# Patient Record
Sex: MALE | Race: WHITE | NOT HISPANIC OR LATINO | Employment: OTHER | URBAN - METROPOLITAN AREA
[De-identification: names, ages, dates, MRNs, and addresses within clinical notes are randomized per-mention and may not be internally consistent; named-entity substitution may affect disease eponyms.]

---

## 2019-12-10 ENCOUNTER — OFFICE VISIT (OUTPATIENT)
Dept: FAMILY MEDICINE | Facility: CLINIC | Age: 69
End: 2019-12-10
Payer: MEDICARE

## 2019-12-10 VITALS
BODY MASS INDEX: 19.51 KG/M2 | WEIGHT: 156.94 LBS | DIASTOLIC BLOOD PRESSURE: 84 MMHG | RESPIRATION RATE: 18 BRPM | SYSTOLIC BLOOD PRESSURE: 130 MMHG | HEART RATE: 74 BPM | TEMPERATURE: 98 F | HEIGHT: 75 IN | OXYGEN SATURATION: 97 %

## 2019-12-10 DIAGNOSIS — Z00.00 PERIODIC HEALTH ASSESSMENT, GENERAL SCREENING, ADULT: ICD-10-CM

## 2019-12-10 DIAGNOSIS — R53.83 FATIGUE, UNSPECIFIED TYPE: Primary | ICD-10-CM

## 2019-12-10 DIAGNOSIS — J41.0 SIMPLE CHRONIC BRONCHITIS: ICD-10-CM

## 2019-12-10 DIAGNOSIS — K08.9 DENTAL DISEASE: ICD-10-CM

## 2019-12-10 DIAGNOSIS — Z72.0 TOBACCO ABUSE: ICD-10-CM

## 2019-12-10 PROCEDURE — 99204 OFFICE O/P NEW MOD 45 MIN: CPT | Mod: S$GLB,,, | Performed by: PHYSICIAN ASSISTANT

## 2019-12-10 PROCEDURE — 99999 PR PBB SHADOW E&M-NEW PATIENT-LVL IV: ICD-10-PCS | Mod: PBBFAC,,, | Performed by: PHYSICIAN ASSISTANT

## 2019-12-10 PROCEDURE — 1159F MED LIST DOCD IN RCRD: CPT | Mod: S$GLB,,, | Performed by: PHYSICIAN ASSISTANT

## 2019-12-10 PROCEDURE — 99204 PR OFFICE/OUTPT VISIT, NEW, LEVL IV, 45-59 MIN: ICD-10-PCS | Mod: S$GLB,,, | Performed by: PHYSICIAN ASSISTANT

## 2019-12-10 PROCEDURE — 1126F PR PAIN SEVERITY QUANTIFIED, NO PAIN PRESENT: ICD-10-PCS | Mod: S$GLB,,, | Performed by: PHYSICIAN ASSISTANT

## 2019-12-10 PROCEDURE — 1126F AMNT PAIN NOTED NONE PRSNT: CPT | Mod: S$GLB,,, | Performed by: PHYSICIAN ASSISTANT

## 2019-12-10 PROCEDURE — 99999 PR PBB SHADOW E&M-NEW PATIENT-LVL IV: CPT | Mod: PBBFAC,,, | Performed by: PHYSICIAN ASSISTANT

## 2019-12-10 PROCEDURE — 1159F PR MEDICATION LIST DOCUMENTED IN MEDICAL RECORD: ICD-10-PCS | Mod: S$GLB,,, | Performed by: PHYSICIAN ASSISTANT

## 2019-12-10 PROCEDURE — 1101F PT FALLS ASSESS-DOCD LE1/YR: CPT | Mod: CPTII,S$GLB,, | Performed by: PHYSICIAN ASSISTANT

## 2019-12-10 PROCEDURE — 1101F PR PT FALLS ASSESS DOC 0-1 FALLS W/OUT INJ PAST YR: ICD-10-PCS | Mod: CPTII,S$GLB,, | Performed by: PHYSICIAN ASSISTANT

## 2019-12-10 RX ORDER — ALBUTEROL SULFATE 90 UG/1
2 AEROSOL, METERED RESPIRATORY (INHALATION) EVERY 4 HOURS PRN
Qty: 1 INHALER | Refills: 5 | Status: SHIPPED | OUTPATIENT
Start: 2019-12-10 | End: 2020-05-22

## 2019-12-10 RX ORDER — ALBUTEROL SULFATE 90 UG/1
AEROSOL, METERED RESPIRATORY (INHALATION)
COMMUNITY
Start: 2016-06-14 | End: 2019-12-10 | Stop reason: SDUPTHER

## 2019-12-10 RX ORDER — BUDESONIDE AND FORMOTEROL FUMARATE DIHYDRATE 160; 4.5 UG/1; UG/1
2 AEROSOL RESPIRATORY (INHALATION) 2 TIMES DAILY
Qty: 1 INHALER | Refills: 12 | Status: SHIPPED | OUTPATIENT
Start: 2019-12-10 | End: 2021-01-08 | Stop reason: SDUPTHER

## 2019-12-10 NOTE — PROGRESS NOTES
Subjective:       Patient ID: Mejia Sandoval is a 69 y.o. male.    Chief Complaint: Medication Refill    HPI   Pt needs refill on albuterol  Hx long use tobacco  Has cough and wheeze  Pt. Is interested in stopping the smoking  Feels well  No regular meds  Has dental disease  fatigue  Review of Systems   Constitutional: Positive for fatigue. Negative for activity change, appetite change, chills, diaphoresis, fever and unexpected weight change.   HENT: Negative.    Eyes: Negative.    Respiratory: Positive for cough and wheezing.    Cardiovascular: Negative.  Negative for chest pain and leg swelling.   Gastrointestinal: Negative.    Endocrine: Negative.    Genitourinary: Negative.    Musculoskeletal: Negative.    Skin: Negative.  Negative for rash.   Neurological: Negative.        Objective:      Physical Exam   Constitutional: He appears well-developed and well-nourished. No distress.   HENT:   Head: Normocephalic and atraumatic.   Right Ear: External ear normal.   Left Ear: External ear normal.   Nose: Nose normal.   Mouth/Throat: Oropharynx is clear and moist. No oropharyngeal exudate.   Eyes: Conjunctivae are normal. No scleral icterus.   Neck: Normal range of motion. Neck supple. No tracheal deviation present. No thyromegaly present.   Carotids clear   Cardiovascular: Normal rate, regular rhythm, normal heart sounds and intact distal pulses. Exam reveals no gallop and no friction rub.   No murmur heard.  Pulmonary/Chest: Effort normal and breath sounds normal. No stridor. No respiratory distress. He has no wheezes. He has no rales.   Abdominal: Soft. Bowel sounds are normal. He exhibits no distension and no mass. There is no tenderness. There is no rebound and no guarding. No hernia.   No organomegaly   Musculoskeletal: He exhibits no edema.   Lymphadenopathy:     He has no cervical adenopathy.   Skin: Skin is warm and dry. No rash noted.   Vitals reviewed.      Assessment:       1. Fatigue, unspecified type    2.  Simple chronic bronchitis    3. Tobacco abuse    4. Dental disease    5. Periodic health assessment, general screening, adult        Plan:       Mejia was seen today for medication refill.    Diagnoses and all orders for this visit:    Fatigue, unspecified type  -     Comprehensive metabolic panel; Future  -     T3; Future  -     T4, free; Future  -     TSH; Future  -     CBC auto differential; Future  -     Hepatitis C antibody; Future  -     Urinalysis; Future    Simple chronic bronchitis  -     Comprehensive metabolic panel; Future  -     T3; Future  -     T4, free; Future  -     TSH; Future  -     CBC auto differential; Future  -     Hepatitis C antibody; Future  -     X-Ray Chest PA And Lateral; Future  -     albuterol (VENTOLIN HFA) 90 mcg/actuation inhaler; Inhale 2 puffs into the lungs every 4 (four) hours as needed. Rescue  -     budesonide-formoterol 160-4.5 mcg (SYMBICORT) 160-4.5 mcg/actuation HFAA; Inhale 2 puffs into the lungs 2 (two) times daily.  -     Urinalysis; Future    Tobacco abuse  -     Ambulatory referral to Smoking Cessation Program  -     Comprehensive metabolic panel; Future  -     T3; Future  -     T4, free; Future  -     TSH; Future  -     CBC auto differential; Future  -     Hepatitis C antibody; Future  -     X-Ray Chest PA And Lateral; Future  -     Urinalysis; Future    Dental disease  -     Comprehensive metabolic panel; Future  -     T3; Future  -     T4, free; Future  -     TSH; Future  -     CBC auto differential; Future  -     Hepatitis C antibody; Future  -     Urinalysis; Future    Periodic health assessment, general screening, adult  -     Comprehensive metabolic panel; Future  -     T3; Future  -     T4, free; Future  -     TSH; Future  -     CBC auto differential; Future  -     Hepatitis C antibody; Future  -     Urinalysis; Future    discussed otc's  F/u with PCP  Dental appt.

## 2020-05-21 DIAGNOSIS — J41.0 SIMPLE CHRONIC BRONCHITIS: ICD-10-CM

## 2020-05-22 RX ORDER — ALBUTEROL SULFATE 90 UG/1
AEROSOL, METERED RESPIRATORY (INHALATION)
Qty: 18 G | Refills: 0 | Status: SHIPPED | OUTPATIENT
Start: 2020-05-22 | End: 2020-08-10 | Stop reason: SDUPTHER

## 2020-08-10 DIAGNOSIS — J41.0 SIMPLE CHRONIC BRONCHITIS: ICD-10-CM

## 2020-08-10 RX ORDER — ALBUTEROL SULFATE 90 UG/1
2 AEROSOL, METERED RESPIRATORY (INHALATION) EVERY 4 HOURS PRN
Qty: 18 G | Refills: 0 | Status: SHIPPED | OUTPATIENT
Start: 2020-08-10 | End: 2020-09-11

## 2020-08-10 NOTE — TELEPHONE ENCOUNTER
----- Message from Heather Freedman sent at 8/10/2020  1:43 PM CDT -----  Type: Needs Medical Advice  Who Called:  Patient  Pharmacy name and phone #: [facility]  Best Call Back Number: [phone] (home)   Additional Information: The patient wants the Rx -- VENTOLIN HFA 90 mcg/actuation inhaler sent to the pharm today

## 2020-10-07 DIAGNOSIS — J41.0 SIMPLE CHRONIC BRONCHITIS: ICD-10-CM

## 2020-10-07 RX ORDER — ALBUTEROL SULFATE 90 UG/1
AEROSOL, METERED RESPIRATORY (INHALATION)
Qty: 18 G | Refills: 0 | Status: SHIPPED | OUTPATIENT
Start: 2020-10-07 | End: 2021-01-08 | Stop reason: SDUPTHER

## 2020-10-07 NOTE — TELEPHONE ENCOUNTER
----- Message from Loyda Torrey sent at 10/7/2020  4:47 PM CDT -----  Regarding: refill  Contact: patient  Type:  RX Refill Request    Who Called:  patient  Refill or New Rx:  refill  RX Name and Strength:  VENTOLIN HFA 90 mcg/actuation inhaler  How is the patient currently taking it? (ex. 1XDay):  as needed  Is this a 30 day or 90 day RX:  n/a    Preferred Pharmacy with phone number:    [id number]  [place] - [address]  [place] [address]  Phone: [phone] Fax: [phone]    Local or Mail Order:  local  Ordering Provider:  JUAN JOSÉ Barakat  Best Call Back Number:  [phone] (home)     Additional Information:

## 2021-01-05 DIAGNOSIS — J41.0 SIMPLE CHRONIC BRONCHITIS: ICD-10-CM

## 2021-01-08 RX ORDER — BUDESONIDE AND FORMOTEROL FUMARATE DIHYDRATE 160; 4.5 UG/1; UG/1
2 AEROSOL RESPIRATORY (INHALATION) 2 TIMES DAILY
Qty: 1 INHALER | Refills: 12 | Status: SHIPPED | OUTPATIENT
Start: 2021-01-08 | End: 2021-07-26 | Stop reason: SDUPTHER

## 2021-01-08 RX ORDER — ALBUTEROL SULFATE 90 UG/1
AEROSOL, METERED RESPIRATORY (INHALATION)
Qty: 18 G | Refills: 1 | Status: SHIPPED | OUTPATIENT
Start: 2021-01-08 | End: 2021-03-14

## 2021-07-06 ENCOUNTER — HOSPITAL ENCOUNTER (EMERGENCY)
Facility: HOSPITAL | Age: 71
Discharge: HOME | End: 2021-07-06
Attending: EMERGENCY MEDICINE
Payer: MEDICARE

## 2021-07-06 VITALS
TEMPERATURE: 98 F | RESPIRATION RATE: 18 BRPM | WEIGHT: 170 LBS | SYSTOLIC BLOOD PRESSURE: 152 MMHG | DIASTOLIC BLOOD PRESSURE: 83 MMHG | HEART RATE: 65 BPM | BODY MASS INDEX: 21.14 KG/M2 | OXYGEN SATURATION: 99 % | HEIGHT: 75 IN

## 2021-07-06 DIAGNOSIS — J44.1 COPD WITH ACUTE EXACERBATION: Primary | ICD-10-CM

## 2021-07-06 DIAGNOSIS — R06.02 SHORTNESS OF BREATH: ICD-10-CM

## 2021-07-06 LAB
ALBUMIN SERPL BCP-MCNC: 3.9 G/DL (ref 3.5–5.2)
ALP SERPL-CCNC: 125 U/L (ref 55–135)
ALT SERPL W/O P-5'-P-CCNC: 17 U/L (ref 10–44)
ANION GAP SERPL CALC-SCNC: 10 MMOL/L (ref 8–16)
AST SERPL-CCNC: 19 U/L (ref 10–40)
BASOPHILS # BLD AUTO: 0.13 K/UL (ref 0–0.2)
BASOPHILS NFR BLD: 1.1 % (ref 0–1.9)
BILIRUB SERPL-MCNC: 0.3 MG/DL (ref 0.1–1)
BUN SERPL-MCNC: 15 MG/DL (ref 8–23)
CALCIUM SERPL-MCNC: 9.9 MG/DL (ref 8.7–10.5)
CHLORIDE SERPL-SCNC: 102 MMOL/L (ref 95–110)
CO2 SERPL-SCNC: 28 MMOL/L (ref 23–29)
CREAT SERPL-MCNC: 1 MG/DL (ref 0.5–1.4)
DIFFERENTIAL METHOD: ABNORMAL
EOSINOPHIL # BLD AUTO: 0.2 K/UL (ref 0–0.5)
EOSINOPHIL NFR BLD: 1.5 % (ref 0–8)
ERYTHROCYTE [DISTWIDTH] IN BLOOD BY AUTOMATED COUNT: 13.5 % (ref 11.5–14.5)
EST. GFR  (AFRICAN AMERICAN): >60 ML/MIN/1.73 M^2
EST. GFR  (NON AFRICAN AMERICAN): >60 ML/MIN/1.73 M^2
GLUCOSE SERPL-MCNC: 98 MG/DL (ref 70–110)
HCT VFR BLD AUTO: 48.8 % (ref 40–54)
HGB BLD-MCNC: 15.6 G/DL (ref 14–18)
IMM GRANULOCYTES # BLD AUTO: 0.04 K/UL (ref 0–0.04)
IMM GRANULOCYTES NFR BLD AUTO: 0.4 % (ref 0–0.5)
LYMPHOCYTES # BLD AUTO: 2.1 K/UL (ref 1–4.8)
LYMPHOCYTES NFR BLD: 18.1 % (ref 18–48)
MCH RBC QN AUTO: 29.4 PG (ref 27–31)
MCHC RBC AUTO-ENTMCNC: 32 G/DL (ref 32–36)
MCV RBC AUTO: 92 FL (ref 82–98)
MONOCYTES # BLD AUTO: 1.1 K/UL (ref 0.3–1)
MONOCYTES NFR BLD: 9.9 % (ref 4–15)
NEUTROPHILS # BLD AUTO: 7.8 K/UL (ref 1.8–7.7)
NEUTROPHILS NFR BLD: 69 % (ref 38–73)
NRBC BLD-RTO: 0 /100 WBC
PLATELET # BLD AUTO: 430 K/UL (ref 150–450)
PMV BLD AUTO: 9.6 FL (ref 9.2–12.9)
POTASSIUM SERPL-SCNC: 5 MMOL/L (ref 3.5–5.1)
PROT SERPL-MCNC: 8.1 G/DL (ref 6–8.4)
RBC # BLD AUTO: 5.3 M/UL (ref 4.6–6.2)
SODIUM SERPL-SCNC: 140 MMOL/L (ref 136–145)
WBC # BLD AUTO: 11.33 K/UL (ref 3.9–12.7)

## 2021-07-06 PROCEDURE — 93005 ELECTROCARDIOGRAM TRACING: CPT

## 2021-07-06 PROCEDURE — 93010 ELECTROCARDIOGRAM REPORT: CPT | Mod: ,,, | Performed by: GENERAL PRACTICE

## 2021-07-06 PROCEDURE — 85025 COMPLETE CBC W/AUTO DIFF WBC: CPT | Performed by: PHYSICIAN ASSISTANT

## 2021-07-06 PROCEDURE — 36415 COLL VENOUS BLD VENIPUNCTURE: CPT | Performed by: PHYSICIAN ASSISTANT

## 2021-07-06 PROCEDURE — 99285 EMERGENCY DEPT VISIT HI MDM: CPT | Mod: 25

## 2021-07-06 PROCEDURE — 25000242 PHARM REV CODE 250 ALT 637 W/ HCPCS: Performed by: PHYSICIAN ASSISTANT

## 2021-07-06 PROCEDURE — 63600175 PHARM REV CODE 636 W HCPCS: Performed by: PHYSICIAN ASSISTANT

## 2021-07-06 PROCEDURE — 96374 THER/PROPH/DIAG INJ IV PUSH: CPT

## 2021-07-06 PROCEDURE — 93010 EKG 12-LEAD: ICD-10-PCS | Mod: ,,, | Performed by: GENERAL PRACTICE

## 2021-07-06 PROCEDURE — 94640 AIRWAY INHALATION TREATMENT: CPT

## 2021-07-06 PROCEDURE — 80053 COMPREHEN METABOLIC PANEL: CPT | Performed by: PHYSICIAN ASSISTANT

## 2021-07-06 RX ORDER — IPRATROPIUM BROMIDE AND ALBUTEROL SULFATE 2.5; .5 MG/3ML; MG/3ML
3 SOLUTION RESPIRATORY (INHALATION)
Status: COMPLETED | OUTPATIENT
Start: 2021-07-06 | End: 2021-07-06

## 2021-07-06 RX ORDER — PREDNISONE 20 MG/1
40 TABLET ORAL DAILY
Qty: 10 TABLET | Refills: 0 | Status: SHIPPED | OUTPATIENT
Start: 2021-07-06 | End: 2021-07-11

## 2021-07-06 RX ORDER — IPRATROPIUM BROMIDE AND ALBUTEROL SULFATE 2.5; .5 MG/3ML; MG/3ML
3 SOLUTION RESPIRATORY (INHALATION) EVERY 6 HOURS PRN
Qty: 4 BOX | Refills: 0 | Status: SHIPPED | OUTPATIENT
Start: 2021-07-06 | End: 2021-07-26 | Stop reason: SDUPTHER

## 2021-07-06 RX ORDER — METHYLPREDNISOLONE SOD SUCC 125 MG
125 VIAL (EA) INJECTION
Status: COMPLETED | OUTPATIENT
Start: 2021-07-06 | End: 2021-07-06

## 2021-07-06 RX ADMIN — IPRATROPIUM BROMIDE AND ALBUTEROL SULFATE 3 ML: .5; 2.5 SOLUTION RESPIRATORY (INHALATION) at 11:07

## 2021-07-06 RX ADMIN — METHYLPREDNISOLONE SODIUM SUCCINATE 125 MG: 125 INJECTION, POWDER, FOR SOLUTION INTRAMUSCULAR; INTRAVENOUS at 12:07

## 2021-07-22 ENCOUNTER — TELEPHONE (OUTPATIENT)
Dept: FAMILY MEDICINE | Facility: CLINIC | Age: 71
End: 2021-07-22

## 2021-07-22 DIAGNOSIS — J41.0 SIMPLE CHRONIC BRONCHITIS: ICD-10-CM

## 2021-07-22 RX ORDER — ALBUTEROL SULFATE 90 UG/1
AEROSOL, METERED RESPIRATORY (INHALATION)
Qty: 18 G | Refills: 0 | OUTPATIENT
Start: 2021-07-22

## 2021-07-26 ENCOUNTER — OFFICE VISIT (OUTPATIENT)
Dept: FAMILY MEDICINE | Facility: CLINIC | Age: 71
End: 2021-07-26
Payer: MEDICARE

## 2021-07-26 VITALS
HEART RATE: 81 BPM | OXYGEN SATURATION: 93 % | BODY MASS INDEX: 17.02 KG/M2 | WEIGHT: 136.88 LBS | SYSTOLIC BLOOD PRESSURE: 122 MMHG | DIASTOLIC BLOOD PRESSURE: 80 MMHG | HEIGHT: 75 IN

## 2021-07-26 DIAGNOSIS — J43.8 OTHER EMPHYSEMA: ICD-10-CM

## 2021-07-26 DIAGNOSIS — F41.9 ANXIETY: ICD-10-CM

## 2021-07-26 DIAGNOSIS — Z72.0 TOBACCO ABUSE: Primary | ICD-10-CM

## 2021-07-26 DIAGNOSIS — R22.1 NECK MASS: ICD-10-CM

## 2021-07-26 DIAGNOSIS — J41.0 SIMPLE CHRONIC BRONCHITIS: ICD-10-CM

## 2021-07-26 PROCEDURE — 99999 PR PBB SHADOW E&M-EST. PATIENT-LVL V: ICD-10-PCS | Mod: PBBFAC,,, | Performed by: PHYSICIAN ASSISTANT

## 2021-07-26 PROCEDURE — 1126F AMNT PAIN NOTED NONE PRSNT: CPT | Mod: CPTII,S$GLB,, | Performed by: PHYSICIAN ASSISTANT

## 2021-07-26 PROCEDURE — 1101F PT FALLS ASSESS-DOCD LE1/YR: CPT | Mod: CPTII,S$GLB,, | Performed by: PHYSICIAN ASSISTANT

## 2021-07-26 PROCEDURE — 1160F RVW MEDS BY RX/DR IN RCRD: CPT | Mod: CPTII,S$GLB,, | Performed by: PHYSICIAN ASSISTANT

## 2021-07-26 PROCEDURE — 3288F FALL RISK ASSESSMENT DOCD: CPT | Mod: CPTII,S$GLB,, | Performed by: PHYSICIAN ASSISTANT

## 2021-07-26 PROCEDURE — 1159F PR MEDICATION LIST DOCUMENTED IN MEDICAL RECORD: ICD-10-PCS | Mod: CPTII,S$GLB,, | Performed by: PHYSICIAN ASSISTANT

## 2021-07-26 PROCEDURE — 3008F BODY MASS INDEX DOCD: CPT | Mod: CPTII,S$GLB,, | Performed by: PHYSICIAN ASSISTANT

## 2021-07-26 PROCEDURE — 3288F PR FALLS RISK ASSESSMENT DOCUMENTED: ICD-10-PCS | Mod: CPTII,S$GLB,, | Performed by: PHYSICIAN ASSISTANT

## 2021-07-26 PROCEDURE — 1159F MED LIST DOCD IN RCRD: CPT | Mod: CPTII,S$GLB,, | Performed by: PHYSICIAN ASSISTANT

## 2021-07-26 PROCEDURE — 3008F PR BODY MASS INDEX (BMI) DOCUMENTED: ICD-10-PCS | Mod: CPTII,S$GLB,, | Performed by: PHYSICIAN ASSISTANT

## 2021-07-26 PROCEDURE — 99999 PR PBB SHADOW E&M-EST. PATIENT-LVL V: CPT | Mod: PBBFAC,,, | Performed by: PHYSICIAN ASSISTANT

## 2021-07-26 PROCEDURE — 99214 OFFICE O/P EST MOD 30 MIN: CPT | Mod: S$GLB,,, | Performed by: PHYSICIAN ASSISTANT

## 2021-07-26 PROCEDURE — 99214 PR OFFICE/OUTPT VISIT, EST, LEVL IV, 30-39 MIN: ICD-10-PCS | Mod: S$GLB,,, | Performed by: PHYSICIAN ASSISTANT

## 2021-07-26 PROCEDURE — 1160F PR REVIEW ALL MEDS BY PRESCRIBER/CLIN PHARMACIST DOCUMENTED: ICD-10-PCS | Mod: CPTII,S$GLB,, | Performed by: PHYSICIAN ASSISTANT

## 2021-07-26 PROCEDURE — 1101F PR PT FALLS ASSESS DOC 0-1 FALLS W/OUT INJ PAST YR: ICD-10-PCS | Mod: CPTII,S$GLB,, | Performed by: PHYSICIAN ASSISTANT

## 2021-07-26 PROCEDURE — 1126F PR PAIN SEVERITY QUANTIFIED, NO PAIN PRESENT: ICD-10-PCS | Mod: CPTII,S$GLB,, | Performed by: PHYSICIAN ASSISTANT

## 2021-07-26 RX ORDER — IPRATROPIUM BROMIDE AND ALBUTEROL SULFATE 2.5; .5 MG/3ML; MG/3ML
3 SOLUTION RESPIRATORY (INHALATION) EVERY 6 HOURS PRN
Qty: 4 BOX | Refills: 5 | Status: SHIPPED | OUTPATIENT
Start: 2021-07-26 | End: 2021-07-26 | Stop reason: SDUPTHER

## 2021-07-26 RX ORDER — IPRATROPIUM BROMIDE AND ALBUTEROL SULFATE 2.5; .5 MG/3ML; MG/3ML
3 SOLUTION RESPIRATORY (INHALATION) EVERY 6 HOURS PRN
Qty: 4 BOX | Refills: 5 | Status: SHIPPED | OUTPATIENT
Start: 2021-07-26 | End: 2022-07-26

## 2021-07-26 RX ORDER — BUPROPION HYDROCHLORIDE 300 MG/1
300 TABLET ORAL DAILY
Qty: 30 TABLET | Refills: 11 | Status: SHIPPED | OUTPATIENT
Start: 2021-07-26 | End: 2023-02-17

## 2021-07-26 RX ORDER — BUDESONIDE AND FORMOTEROL FUMARATE DIHYDRATE 160; 4.5 UG/1; UG/1
2 AEROSOL RESPIRATORY (INHALATION) 2 TIMES DAILY
Qty: 1 INHALER | Refills: 12 | Status: SHIPPED | OUTPATIENT
Start: 2021-07-26 | End: 2022-09-07

## 2021-08-05 ENCOUNTER — HOSPITAL ENCOUNTER (OUTPATIENT)
Dept: RADIOLOGY | Facility: HOSPITAL | Age: 71
Discharge: HOME | End: 2021-08-05
Attending: PHYSICIAN ASSISTANT
Payer: MEDICARE

## 2021-08-05 DIAGNOSIS — R22.1 NECK MASS: ICD-10-CM

## 2021-08-05 PROCEDURE — 76536 US EXAM OF HEAD AND NECK: CPT | Mod: TC

## 2021-08-05 PROCEDURE — 76536 US SOFT TISSUE HEAD NECK THYROID: ICD-10-PCS | Mod: 26,,, | Performed by: RADIOLOGY

## 2021-08-05 PROCEDURE — 76536 US EXAM OF HEAD AND NECK: CPT | Mod: 26,,, | Performed by: RADIOLOGY

## 2021-08-06 ENCOUNTER — TELEPHONE (OUTPATIENT)
Dept: FAMILY MEDICINE | Facility: CLINIC | Age: 71
End: 2021-08-06

## 2022-05-05 ENCOUNTER — TELEPHONE (OUTPATIENT)
Dept: OPHTHALMOLOGY | Facility: CLINIC | Age: 72
End: 2022-05-05
Payer: MEDICAID

## 2022-05-05 ENCOUNTER — TELEPHONE (OUTPATIENT)
Dept: OPTOMETRY | Facility: CLINIC | Age: 72
End: 2022-05-05
Payer: MEDICAID

## 2022-05-05 NOTE — TELEPHONE ENCOUNTER
----- Message from Tasha Villegas sent at 5/5/2022  2:13 PM CDT -----  Contact: pt   [phone]  Pt wants to have cataract sx and wants to schedule eval, please help :)TY!  ----- Message -----  From: Nereida Salvador  Sent: 5/5/2022   1:39 PM CDT  To: [facility] Staff    Type:  Sooner Appointment Request    Caller is requesting a sooner appointment.  Caller declined first available appointment listed below.  Caller will not accept being placed on the waitlist and is requesting a message be sent to doctor.    Name of Caller:  Pt  When is the first available appointment?  NA  Symptoms:  Cataract  Best Call Back Number:  [phone]  Additional Information:  Pt needs cataract removal surgery.Pls call back and advise

## 2022-06-15 ENCOUNTER — TELEPHONE (OUTPATIENT)
Dept: FAMILY MEDICINE | Facility: CLINIC | Age: 72
End: 2022-06-15
Payer: MEDICAID

## 2022-06-15 NOTE — TELEPHONE ENCOUNTER
----- Message from Joanna Hurd sent at 6/15/2022 12:29 PM CDT -----  Regarding: self [phone]  .Type: RX Refill Request    Who Called: self     Have you contacted your pharmacy yes     Refill or New Rx: refill    RX Name and Strength: COMBIVENT RESPIMAT  mcg/actuation inhaler, budesonide-formoterol 160-4.5 mcg (SYMBICORT) 160-4.5 mcg/actuation HFAA    Is this a 30 day or 90 day RX 90 day    Preferred Pharmacy with phone number: .    [id number] - [place] - [address]  [facility] [place] [address]  Phone: [phone] Fax: [phone]    Local or Mail Order: local      Would the patient rather a call back or a response via [facility]?  Call back    Best Call Back Number .[phone]

## 2022-09-07 DIAGNOSIS — J43.8 OTHER EMPHYSEMA: ICD-10-CM

## 2022-09-07 DIAGNOSIS — J41.0 SIMPLE CHRONIC BRONCHITIS: ICD-10-CM

## 2022-09-07 NOTE — TELEPHONE ENCOUNTER
----- Message from Federica Redding sent at 9/7/2022  3:02 PM CDT -----  Regarding: refill  Can the clinic reply in [facility]:       Please refill the medication(s) listed below. Please call the patient when the prescription(s) is ready for  at this phone number   GUME RAHMAN [[id number]] [phone] (home)         Medication #1 COMBIVENT RESPIMAT  mcg/actuation inhaler    Medication #2 budesonide-formoterol 160-4.5 mcg (SYMBICORT) 160-4.5 mcg/actuation HFAA      Preferred Pharmacy:         [facility] #[id number] - [place] - [address] AT [facility] [address]  [address] [place] [address]  Phone: [phone] Fax: [phone]

## 2022-09-19 RX ORDER — BUDESONIDE AND FORMOTEROL FUMARATE DIHYDRATE 160; 4.5 UG/1; UG/1
2 AEROSOL RESPIRATORY (INHALATION) 2 TIMES DAILY
Qty: 30.6 G | Refills: 0 | Status: SHIPPED | OUTPATIENT
Start: 2022-09-19 | End: 2023-01-12

## 2022-09-19 RX ORDER — IPRATROPIUM BROMIDE AND ALBUTEROL 20; 100 UG/1; UG/1
SPRAY, METERED RESPIRATORY (INHALATION)
Qty: 4 G | Refills: 0 | Status: SHIPPED | OUTPATIENT
Start: 2022-09-19 | End: 2022-10-11

## 2022-11-16 ENCOUNTER — TELEPHONE (OUTPATIENT)
Dept: FAMILY MEDICINE | Facility: CLINIC | Age: 72
End: 2022-11-16
Payer: MEDICARE

## 2022-11-16 NOTE — TELEPHONE ENCOUNTER
----- Message from Bel Bernstein sent at 11/16/2022 11:14 AM CST -----  Contact: pt  Type:  RX Refill Request    Who Called:   pt  Refill or New Rx:  refill  RX Name and Strength:   ipratropium-albuteroL (COMBIVENT RESPIMAT)  mcg/actuation inhaler  How is the patient currently taking it? (ex. 1XDay):  as ordered  Is this a 30 day or 90 day RX:  30 with refills  Preferred Pharmacy with phone number:      [id number] - [place], [place] - [address] [facility] [address]  [address] [place] [address]  Phone: [phone] Fax: [phone]    Local or Mail Order:  local  Ordering Provider:  Pj Braxton Call Back Number:  [phone]    Additional Information:    Pt is also needing a new order for a nebulizer along with a refill for his albuterol-ipratropium (DUO-NEB) 2.5 mg-0.5 mg/3 mL nebulizer solution.   Pt said his nebulizer is not working correctly, not pushing medication out.   Pt is asking for a call back please.       
Returned patients call in regards to medication refill. Per Mr Oliva patient will need to be seen in clinic for a medication refill.   
weakness; coumadin/coagulation(Bleeding disorder R/T clinical cond/anti-coags)/other

## 2022-11-21 ENCOUNTER — TELEPHONE (OUTPATIENT)
Dept: FAMILY MEDICINE | Facility: CLINIC | Age: 72
End: 2022-11-21
Payer: MEDICARE

## 2022-11-21 NOTE — TELEPHONE ENCOUNTER
----- Message from Mo Rojas sent at 11/21/2022  3:18 PM CST -----  Type: Needs Medical Advice  Who Called:  Patient    Pharmacy name and phone #:        [id number] - [place] - [address] AT [facility] [address] [facility] [address]  [address] [place] [address]  Phone: [phone] Fax: [phone]    Best Call Back Number:[phone]  Additional Information: Patient states that his inhaler was denied:  ipratropium-albuteroL (COMBIVENT RESPIMAT)  mcg/actuation inhaler    Please call to advise.

## 2022-11-21 NOTE — TELEPHONE ENCOUNTER
Spoke with patient regarding his call , inform him that he had a refill at the pharmacy. Patient will  medication

## 2023-02-16 ENCOUNTER — TELEPHONE (OUTPATIENT)
Dept: FAMILY MEDICINE | Facility: CLINIC | Age: 73
End: 2023-02-16
Payer: MEDICARE

## 2023-02-16 NOTE — TELEPHONE ENCOUNTER
----- Message from Jasson Jarrell sent at 2/16/2023 10:30 AM CST -----  Who Called: patient          Refill or New Rx.:     COMBIVENT RESPIMAT  mcg/actuation inhaler 4 g 0 1/12/2023  No  Sig: INHALE 1 PUFF INTO THE LUNGS EVERY 6 HOURS AS NEEDED FOR WHEEZING OR SHORTNESS OF BREATH  Sent to pharmacy as: COMBIVENT RESPIMAT  mcg/actuation inhaler  Class: Normal    SYMBICORT 160-4.5 mcg/actuation HFAA 30.6 g 0 1/12/2023  No  Sig: INHALE 2 PUFFS INTO THE LUNGS TWICE DAILY  Sent to pharmacy as: SYMBICORT 160-4.5 mcg/actuation HFAA  Class: Normal             Preferred Pharmacy with phone number:         [id number] - [place] - [address] AT [facility] [address]  [address] [address]  Phone: [phone] Fax: [phone]        Local or Mail Order: local          Ordering Provider: alexandre Braxton Call Back Number: [phone]           Additional Information:

## 2023-02-16 NOTE — TELEPHONE ENCOUNTER
----- Message from Laura Garcia sent at 2/16/2023 12:36 PM CST -----  Regarding: Refill Prescriptons  Good afternoon. Mr. Ba would like for someone to reach out to him regarding his two inhaler prescriptions: Combivent respimat and Symbicort. He says it is urgent and he is really struggling without them. He can be reached at his primary phone number on file. Thanks.

## 2023-02-16 NOTE — TELEPHONE ENCOUNTER
Spoke to pt and son and adv pt needs appt. Has not been seen since 2021. Scheduled appt for tomorrow and adv needs to est care with PCP. Adv date, time and location, voiced understanding.

## 2023-02-17 ENCOUNTER — OFFICE VISIT (OUTPATIENT)
Dept: FAMILY MEDICINE | Facility: CLINIC | Age: 73
End: 2023-02-17
Payer: MEDICARE

## 2023-02-17 ENCOUNTER — TELEPHONE (OUTPATIENT)
Dept: FAMILY MEDICINE | Facility: CLINIC | Age: 73
End: 2023-02-17

## 2023-02-17 VITALS
OXYGEN SATURATION: 96 % | DIASTOLIC BLOOD PRESSURE: 74 MMHG | SYSTOLIC BLOOD PRESSURE: 136 MMHG | BODY MASS INDEX: 16.69 KG/M2 | WEIGHT: 134.25 LBS | HEIGHT: 75 IN | HEART RATE: 66 BPM | TEMPERATURE: 94 F

## 2023-02-17 DIAGNOSIS — J43.8 OTHER EMPHYSEMA: ICD-10-CM

## 2023-02-17 DIAGNOSIS — Z00.00 ANNUAL PHYSICAL EXAM: Primary | ICD-10-CM

## 2023-02-17 DIAGNOSIS — Z72.0 TOBACCO ABUSE: ICD-10-CM

## 2023-02-17 PROCEDURE — 1160F RVW MEDS BY RX/DR IN RCRD: CPT | Mod: CPTII,S$GLB,, | Performed by: NURSE PRACTITIONER

## 2023-02-17 PROCEDURE — 3008F BODY MASS INDEX DOCD: CPT | Mod: CPTII,S$GLB,, | Performed by: NURSE PRACTITIONER

## 2023-02-17 PROCEDURE — 99999 PR PBB SHADOW E&M-EST. PATIENT-LVL III: CPT | Mod: PBBFAC,,, | Performed by: NURSE PRACTITIONER

## 2023-02-17 PROCEDURE — 94640 PR INHAL RX, AIRWAY OBST/DX SPUTUM INDUCT: ICD-10-PCS | Mod: S$GLB,,, | Performed by: NURSE PRACTITIONER

## 2023-02-17 PROCEDURE — 3075F PR MOST RECENT SYSTOLIC BLOOD PRESS GE 130-139MM HG: ICD-10-PCS | Mod: CPTII,S$GLB,, | Performed by: NURSE PRACTITIONER

## 2023-02-17 PROCEDURE — 3288F FALL RISK ASSESSMENT DOCD: CPT | Mod: CPTII,S$GLB,, | Performed by: NURSE PRACTITIONER

## 2023-02-17 PROCEDURE — 96372 PR INJECTION,THERAP/PROPH/DIAG2ST, IM OR SUBCUT: ICD-10-PCS | Mod: 59,S$GLB,, | Performed by: NURSE PRACTITIONER

## 2023-02-17 PROCEDURE — 1126F AMNT PAIN NOTED NONE PRSNT: CPT | Mod: CPTII,S$GLB,, | Performed by: NURSE PRACTITIONER

## 2023-02-17 PROCEDURE — 3075F SYST BP GE 130 - 139MM HG: CPT | Mod: CPTII,S$GLB,, | Performed by: NURSE PRACTITIONER

## 2023-02-17 PROCEDURE — 3078F DIAST BP <80 MM HG: CPT | Mod: CPTII,S$GLB,, | Performed by: NURSE PRACTITIONER

## 2023-02-17 PROCEDURE — 1126F PR PAIN SEVERITY QUANTIFIED, NO PAIN PRESENT: ICD-10-PCS | Mod: CPTII,S$GLB,, | Performed by: NURSE PRACTITIONER

## 2023-02-17 PROCEDURE — 1159F PR MEDICATION LIST DOCUMENTED IN MEDICAL RECORD: ICD-10-PCS | Mod: CPTII,S$GLB,, | Performed by: NURSE PRACTITIONER

## 2023-02-17 PROCEDURE — 1160F PR REVIEW ALL MEDS BY PRESCRIBER/CLIN PHARMACIST DOCUMENTED: ICD-10-PCS | Mod: CPTII,S$GLB,, | Performed by: NURSE PRACTITIONER

## 2023-02-17 PROCEDURE — 1101F PR PT FALLS ASSESS DOC 0-1 FALLS W/OUT INJ PAST YR: ICD-10-PCS | Mod: CPTII,S$GLB,, | Performed by: NURSE PRACTITIONER

## 2023-02-17 PROCEDURE — 1101F PT FALLS ASSESS-DOCD LE1/YR: CPT | Mod: CPTII,S$GLB,, | Performed by: NURSE PRACTITIONER

## 2023-02-17 PROCEDURE — 3288F PR FALLS RISK ASSESSMENT DOCUMENTED: ICD-10-PCS | Mod: CPTII,S$GLB,, | Performed by: NURSE PRACTITIONER

## 2023-02-17 PROCEDURE — 99214 PR OFFICE/OUTPT VISIT, EST, LEVL IV, 30-39 MIN: ICD-10-PCS | Mod: 25,S$GLB,, | Performed by: NURSE PRACTITIONER

## 2023-02-17 PROCEDURE — 99214 OFFICE O/P EST MOD 30 MIN: CPT | Mod: 25,S$GLB,, | Performed by: NURSE PRACTITIONER

## 2023-02-17 PROCEDURE — 3078F PR MOST RECENT DIASTOLIC BLOOD PRESSURE < 80 MM HG: ICD-10-PCS | Mod: CPTII,S$GLB,, | Performed by: NURSE PRACTITIONER

## 2023-02-17 PROCEDURE — 99999 PR PBB SHADOW E&M-EST. PATIENT-LVL III: ICD-10-PCS | Mod: PBBFAC,,, | Performed by: NURSE PRACTITIONER

## 2023-02-17 PROCEDURE — 1159F MED LIST DOCD IN RCRD: CPT | Mod: CPTII,S$GLB,, | Performed by: NURSE PRACTITIONER

## 2023-02-17 PROCEDURE — 96372 THER/PROPH/DIAG INJ SC/IM: CPT | Mod: 59,S$GLB,, | Performed by: NURSE PRACTITIONER

## 2023-02-17 PROCEDURE — 94640 AIRWAY INHALATION TREATMENT: CPT | Mod: S$GLB,,, | Performed by: NURSE PRACTITIONER

## 2023-02-17 PROCEDURE — 3008F PR BODY MASS INDEX (BMI) DOCUMENTED: ICD-10-PCS | Mod: CPTII,S$GLB,, | Performed by: NURSE PRACTITIONER

## 2023-02-17 RX ORDER — PREDNISONE 20 MG/1
40 TABLET ORAL DAILY
Qty: 10 TABLET | Refills: 0 | Status: SHIPPED | OUTPATIENT
Start: 2023-02-19 | End: 2023-02-24

## 2023-02-17 RX ORDER — IPRATROPIUM BROMIDE AND ALBUTEROL SULFATE 2.5; .5 MG/3ML; MG/3ML
3 SOLUTION RESPIRATORY (INHALATION)
Status: COMPLETED | OUTPATIENT
Start: 2023-02-17 | End: 2023-02-17

## 2023-02-17 RX ORDER — ALBUTEROL SULFATE 90 UG/1
2 AEROSOL, METERED RESPIRATORY (INHALATION) EVERY 6 HOURS PRN
Qty: 6.7 G | Refills: 5 | Status: SHIPPED | OUTPATIENT
Start: 2023-02-17 | End: 2023-07-25 | Stop reason: SDUPTHER

## 2023-02-17 RX ORDER — IPRATROPIUM BROMIDE AND ALBUTEROL SULFATE 2.5; .5 MG/3ML; MG/3ML
3 SOLUTION RESPIRATORY (INHALATION) EVERY 6 HOURS PRN
Qty: 75 ML | Refills: 3 | Status: SHIPPED | OUTPATIENT
Start: 2023-02-17

## 2023-02-17 RX ORDER — IPRATROPIUM BROMIDE AND ALBUTEROL SULFATE 2.5; .5 MG/3ML; MG/3ML
3 SOLUTION RESPIRATORY (INHALATION) EVERY 6 HOURS PRN
COMMUNITY
End: 2023-02-17 | Stop reason: SDUPTHER

## 2023-02-17 RX ORDER — ALBUTEROL SULFATE 90 UG/1
2 AEROSOL, METERED RESPIRATORY (INHALATION) EVERY 6 HOURS PRN
Qty: 6.7 G | Refills: 5 | Status: SHIPPED | OUTPATIENT
Start: 2023-02-17 | End: 2023-02-17

## 2023-02-17 RX ADMIN — IPRATROPIUM BROMIDE AND ALBUTEROL SULFATE 3 ML: 2.5; .5 SOLUTION RESPIRATORY (INHALATION) at 08:02

## 2023-02-17 NOTE — PROGRESS NOTES
Subjective:       Patient ID: Mejia Sandoval is a 72 y.o. male.    Chief Complaint: Medication Refill    Medication Refill  Pertinent negatives include no chest pain, headaches or rash.       Patient presents today for chronic conditions follow up. He is new to me. Last office visit on 7/26/21. He is due for labs and medication refills.  Past Medical History:   Diagnosis Date    COPD (chronic obstructive pulmonary disease)        Review of patient's allergies indicates:  No Known Allergies      Current Outpatient Medications:     albuterol (PROVENTIL HFA) 90 mcg/actuation inhaler, Inhale 2 puffs into the lungs every 6 (six) hours as needed for Wheezing. Rescue, Disp: 6.7 g, Rfl: 5    albuterol-ipratropium (DUO-NEB) 2.5 mg-0.5 mg/3 mL nebulizer solution, Take 3 mLs by nebulization every 6 (six) hours as needed for Wheezing. Rescue, Disp: 75 mL, Rfl: 3    aspirin/salicylamide/caffeine (BC HEADACHE POWDER ORAL), Take by mouth., Disp: , Rfl:     buPROPion (WELLBUTRIN XL) 300 MG 24 hr tablet, Take 1 tablet (300 mg total) by mouth once daily., Disp: 30 tablet, Rfl: 11    fluticasone-umeclidin-vilanter (TRELEGY ELLIPTA) 100-62.5-25 mcg DsDv, Inhale 1 puff into the lungs once daily., Disp: 28 each, Rfl: 5    Current Facility-Administered Medications:     albuterol-ipratropium 2.5 mg-0.5 mg/3 mL nebulizer solution 3 mL, 3 mL, Nebulization, 1 time in Clinic/HOD, Jovita Macias NP    methylPREDNISolone sod suc(PF) injection 125 mg, 125 mg, Intravenous, 1 time in Clinic/HOD, Jovita Macias NP    Review of Systems   Constitutional:  Negative for unexpected weight change.   HENT:  Negative for trouble swallowing.    Eyes:  Negative for visual disturbance.   Respiratory:  Negative for shortness of breath.    Cardiovascular:  Negative for chest pain, palpitations and leg swelling.   Gastrointestinal:  Negative for blood in stool.   Genitourinary:  Negative for hematuria.   Skin:  Negative for rash.  "  Allergic/Immunologic: Negative for immunocompromised state.   Neurological:  Negative for headaches.   Hematological:  Does not bruise/bleed easily.   Psychiatric/Behavioral:  Negative for agitation. The patient is not nervous/anxious.      Objective:      /74 (BP Location: Right arm, Patient Position: Sitting, BP Method: Medium (Manual))   Pulse 66   Temp (!) 94.1 °F (34.5 °C)   Ht 6' 3" (1.905 m)   Wt 60.9 kg (134 lb 4.2 oz)   SpO2 96%   BMI 16.78 kg/m²   Physical Exam  Constitutional:       Appearance: He is well-developed.   Eyes:      Conjunctiva/sclera: Conjunctivae normal.      Pupils: Pupils are equal, round, and reactive to light.   Cardiovascular:      Rate and Rhythm: Normal rate and regular rhythm.      Heart sounds: Normal heart sounds.   Pulmonary:      Effort: Accessory muscle usage present.      Breath sounds: Examination of the right-upper field reveals wheezing. Examination of the left-upper field reveals wheezing. Examination of the right-middle field reveals wheezing. Examination of the left-middle field reveals wheezing. Examination of the right-lower field reveals wheezing. Examination of the left-lower field reveals wheezing. Wheezing present.   Musculoskeletal:         General: Normal range of motion.   Neurological:      Mental Status: He is alert and oriented to person, place, and time.   Psychiatric:         Behavior: Behavior normal.         Thought Content: Thought content normal.         Judgment: Judgment normal.       Assessment:       1. Annual physical exam    2. Other emphysema    3. Tobacco abuse    4. Body mass index (BMI) less than 16.5        Plan:       Annual physical exam  -     CBC W/ AUTO DIFFERENTIAL; Future; Expected date: 02/17/2023  -     COMPREHENSIVE METABOLIC PANEL; Future; Expected date: 02/17/2023  -     Lipid Panel; Future; Expected date: 02/17/2023  -     TSH; Future; Expected date: 02/17/2023  -     Hepatitis C Antibody; Future; Expected date: " 02/17/2023    Other emphysema  -     NEBULIZER FOR HOME USE  -     methylPREDNISolone sod suc(PF) injection 125 mg  -     albuterol-ipratropium 2.5 mg-0.5 mg/3 mL nebulizer solution 3 mL  -     albuterol-ipratropium (DUO-NEB) 2.5 mg-0.5 mg/3 mL nebulizer solution; Take 3 mLs by nebulization every 6 (six) hours as needed for Wheezing. Rescue  Dispense: 75 mL; Refill: 3  -     albuterol (PROVENTIL HFA) 90 mcg/actuation inhaler; Inhale 2 puffs into the lungs every 6 (six) hours as needed for Wheezing. Rescue  Dispense: 6.7 g; Refill: 5  -     fluticasone-umeclidin-vilanter (TRELEGY ELLIPTA) 100-62.5-25 mcg DsDv; Inhale 1 puff into the lungs once daily.  Dispense: 28 each; Refill: 5    Tobacco abuse  Educated on tobacco cessation  Body mass index (BMI) less than 16.5  Ensure 3 times daily; increase protein intake    Time spent with patient: 20 minutes    Patient with be reevaluated in 6 months or sooner prn    Greater than 50% of this visit was spent counseling as described in above documentation:Yes

## 2023-02-17 NOTE — TELEPHONE ENCOUNTER
Spoke to patient. He states that told him his inhaler would be at the [facility]. He was unsure of what they meant by that. I phoned [facility] @[address]. I was informed that this store does not have the Trelegy Elipta in stock and patient could have rx sent to the [facility].  I was also advised that the patient's  albuterol inhaler was filled somewhere else today and could not be filled at [facility].  Chart showed albuterol inhaler was initially sent to [facility] on [facility] and then to [facility] today.  Phoned [facility] and confirmed they had patients albuterol rx ready for . [facility] also had Trelegy in stock.  Phoned in Trelegy to [facility] as written. Patient was informed and will  his prescriptions at [facility] this evening.  Trelegy was cancelled at [facility].

## 2023-02-17 NOTE — TELEPHONE ENCOUNTER
----- Message from Mo Rojas sent at 2/17/2023  2:50 PM CST -----  Type: Needs Medical Advice  Who Called:  Patient    Pharmacy name and phone #:        [id number] - [place] - [address] AT [facility] & [facility] [address]  [address] [place] [address]  Phone: [phone] Fax: [phone]    Best Call Back Number: [phone]  Additional Information: Patient states that he would like a callback regarding questions about his refills.

## 2023-02-17 NOTE — PATIENT INSTRUCTIONS
Liam Ba,     If you are due for any health screening(s) below please notify me so we can arrange them to be ordered and scheduled to maintain your health. Most healthy patients complete it. Don't lose out on improving your health.     Tests to Keep You Healthy    Colon Cancer Screening: DUE         Colon Cancer Screening    Of cancers affecting both men and women, colorectal cancer is the third leading cancer killer in the [place]. But it doesnt have to be. Screening can prevent colorectal cancer or find it at an early stage when treatment often leads to a cure.    A colonoscopy is the preferred test for detecting colon cancer. It is needed only once every 10 years if results are negative. While sedated, a flexible, lighted tube with a tiny camera is inserted into the rectum and advanced through the colon to look for cancers. An alternative screening test that is used at home and returned to the lab may also be used. It detects hidden blood in bowel movements which could indicate cancer in the colon. If results are positive, you will need a colonoscopy to determine if the blood is a sign of cancer. This type of follow up (diagnostic) colonoscopy usually requires additional copays as required by your insurance provider. Please contact your PCP if you have any questions.    Although you are still overdue for this important screening, due to the COVID-19 pandemic, we recommend scheduling it in the near future.

## 2023-07-25 DIAGNOSIS — J43.8 OTHER EMPHYSEMA: ICD-10-CM

## 2023-07-26 DIAGNOSIS — J43.8 OTHER EMPHYSEMA: ICD-10-CM

## 2023-07-26 RX ORDER — ALBUTEROL SULFATE 90 UG/1
2 AEROSOL, METERED RESPIRATORY (INHALATION) EVERY 6 HOURS PRN
Qty: 6.7 G | Refills: 5 | Status: SHIPPED | OUTPATIENT
Start: 2023-07-26 | End: 2024-07-25

## 2024-05-22 ENCOUNTER — HOSPITAL ENCOUNTER (EMERGENCY)
Facility: HOSPITAL | Age: 74
Discharge: HOME | End: 2024-05-22
Attending: EMERGENCY MEDICINE
Payer: MEDICARE

## 2024-05-22 VITALS
TEMPERATURE: 98 F | RESPIRATION RATE: 20 BRPM | HEIGHT: 74 IN | WEIGHT: 140 LBS | DIASTOLIC BLOOD PRESSURE: 65 MMHG | HEART RATE: 66 BPM | OXYGEN SATURATION: 94 % | BODY MASS INDEX: 17.97 KG/M2 | SYSTOLIC BLOOD PRESSURE: 111 MMHG

## 2024-05-22 DIAGNOSIS — J43.8 OTHER EMPHYSEMA: ICD-10-CM

## 2024-05-22 DIAGNOSIS — R06.02 SOB (SHORTNESS OF BREATH): ICD-10-CM

## 2024-05-22 DIAGNOSIS — J44.1 COPD EXACERBATION: Primary | ICD-10-CM

## 2024-05-22 LAB
ALBUMIN SERPL BCP-MCNC: 4.2 G/DL (ref 3.5–5.2)
ALP SERPL-CCNC: 58 U/L (ref 55–135)
ALT SERPL W/O P-5'-P-CCNC: 13 U/L (ref 10–44)
ANION GAP SERPL CALC-SCNC: 6 MMOL/L (ref 8–16)
AST SERPL-CCNC: 23 U/L (ref 10–40)
BASOPHILS # BLD AUTO: 0.1 K/UL (ref 0–0.2)
BASOPHILS NFR BLD: 1.1 % (ref 0–1.9)
BILIRUB SERPL-MCNC: 0.5 MG/DL (ref 0.1–1)
BNP SERPL-MCNC: 27 PG/ML (ref 0–99)
BUN SERPL-MCNC: 15 MG/DL (ref 8–23)
CALCIUM SERPL-MCNC: 9.1 MG/DL (ref 8.7–10.5)
CHLORIDE SERPL-SCNC: 101 MMOL/L (ref 95–110)
CO2 SERPL-SCNC: 29 MMOL/L (ref 23–29)
CREAT SERPL-MCNC: 0.8 MG/DL (ref 0.5–1.4)
DIFFERENTIAL METHOD BLD: ABNORMAL
EOSINOPHIL # BLD AUTO: 0.1 K/UL (ref 0–0.5)
EOSINOPHIL NFR BLD: 1.2 % (ref 0–8)
ERYTHROCYTE [DISTWIDTH] IN BLOOD BY AUTOMATED COUNT: 14.7 % (ref 11.5–14.5)
EST. GFR  (NO RACE VARIABLE): >60 ML/MIN/1.73 M^2
GLUCOSE SERPL-MCNC: 97 MG/DL (ref 70–110)
HCT VFR BLD AUTO: 43.4 % (ref 40–54)
HGB BLD-MCNC: 14.1 G/DL (ref 14–18)
IMM GRANULOCYTES # BLD AUTO: 0.04 K/UL (ref 0–0.04)
IMM GRANULOCYTES NFR BLD AUTO: 0.4 % (ref 0–0.5)
LYMPHOCYTES # BLD AUTO: 2.3 K/UL (ref 1–4.8)
LYMPHOCYTES NFR BLD: 24.3 % (ref 18–48)
MAGNESIUM SERPL-MCNC: 2.1 MG/DL (ref 1.6–2.6)
MCH RBC QN AUTO: 30.3 PG (ref 27–31)
MCHC RBC AUTO-ENTMCNC: 32.5 G/DL (ref 32–36)
MCV RBC AUTO: 93 FL (ref 82–98)
MONOCYTES # BLD AUTO: 1 K/UL (ref 0.3–1)
MONOCYTES NFR BLD: 10.4 % (ref 4–15)
NEUTROPHILS # BLD AUTO: 5.8 K/UL (ref 1.8–7.7)
NEUTROPHILS NFR BLD: 62.6 % (ref 38–73)
NRBC BLD-RTO: 0 /100 WBC
PLATELET # BLD AUTO: 361 K/UL (ref 150–450)
PMV BLD AUTO: 9.7 FL (ref 9.2–12.9)
POTASSIUM SERPL-SCNC: 4.6 MMOL/L (ref 3.5–5.1)
PROT SERPL-MCNC: 7.2 G/DL (ref 6–8.4)
RBC # BLD AUTO: 4.66 M/UL (ref 4.6–6.2)
SODIUM SERPL-SCNC: 136 MMOL/L (ref 136–145)
TROPONIN I SERPL HS-MCNC: 5.6 PG/ML (ref 0–14.9)
WBC # BLD AUTO: 9.29 K/UL (ref 3.9–12.7)

## 2024-05-22 PROCEDURE — 83880 ASSAY OF NATRIURETIC PEPTIDE: CPT | Performed by: EMERGENCY MEDICINE

## 2024-05-22 PROCEDURE — 36415 COLL VENOUS BLD VENIPUNCTURE: CPT | Performed by: EMERGENCY MEDICINE

## 2024-05-22 PROCEDURE — 93005 ELECTROCARDIOGRAM TRACING: CPT | Performed by: GENERAL PRACTICE

## 2024-05-22 PROCEDURE — 99900031 HC PATIENT EDUCATION (STAT)

## 2024-05-22 PROCEDURE — 83735 ASSAY OF MAGNESIUM: CPT | Performed by: EMERGENCY MEDICINE

## 2024-05-22 PROCEDURE — 94640 AIRWAY INHALATION TREATMENT: CPT

## 2024-05-22 PROCEDURE — 80053 COMPREHEN METABOLIC PANEL: CPT | Performed by: EMERGENCY MEDICINE

## 2024-05-22 PROCEDURE — 25000242 PHARM REV CODE 250 ALT 637 W/ HCPCS: Performed by: EMERGENCY MEDICINE

## 2024-05-22 PROCEDURE — 84484 ASSAY OF TROPONIN QUANT: CPT | Performed by: EMERGENCY MEDICINE

## 2024-05-22 PROCEDURE — 93010 ELECTROCARDIOGRAM REPORT: CPT | Mod: ,,, | Performed by: GENERAL PRACTICE

## 2024-05-22 PROCEDURE — 96374 THER/PROPH/DIAG INJ IV PUSH: CPT

## 2024-05-22 PROCEDURE — 85025 COMPLETE CBC W/AUTO DIFF WBC: CPT | Performed by: EMERGENCY MEDICINE

## 2024-05-22 PROCEDURE — 94761 N-INVAS EAR/PLS OXIMETRY MLT: CPT

## 2024-05-22 PROCEDURE — 99285 EMERGENCY DEPT VISIT HI MDM: CPT | Mod: 25

## 2024-05-22 PROCEDURE — 63600175 PHARM REV CODE 636 W HCPCS: Performed by: EMERGENCY MEDICINE

## 2024-05-22 RX ORDER — METHYLPREDNISOLONE SOD SUCC 125 MG
125 VIAL (EA) INJECTION
Status: COMPLETED | OUTPATIENT
Start: 2024-05-22 | End: 2024-05-22

## 2024-05-22 RX ORDER — IPRATROPIUM BROMIDE AND ALBUTEROL SULFATE 2.5; .5 MG/3ML; MG/3ML
3 SOLUTION RESPIRATORY (INHALATION) EVERY 6 HOURS PRN
Qty: 75 ML | Refills: 3 | Status: SHIPPED | OUTPATIENT
Start: 2024-05-22

## 2024-05-22 RX ORDER — PREDNISONE 20 MG/1
40 TABLET ORAL DAILY
Qty: 10 TABLET | Refills: 0 | Status: SHIPPED | OUTPATIENT
Start: 2024-05-22 | End: 2024-05-27

## 2024-05-22 RX ORDER — IPRATROPIUM BROMIDE AND ALBUTEROL SULFATE 2.5; .5 MG/3ML; MG/3ML
3 SOLUTION RESPIRATORY (INHALATION)
Status: COMPLETED | OUTPATIENT
Start: 2024-05-22 | End: 2024-05-22

## 2024-05-22 RX ADMIN — IPRATROPIUM BROMIDE AND ALBUTEROL SULFATE 3 ML: .5; 3 SOLUTION RESPIRATORY (INHALATION) at 11:05

## 2024-05-22 RX ADMIN — METHYLPREDNISOLONE SODIUM SUCCINATE 125 MG: 125 INJECTION, POWDER, FOR SOLUTION INTRAMUSCULAR; INTRAVENOUS at 11:05

## 2024-05-22 NOTE — ED PROVIDER NOTES
Encounter Date: 5/22/2024       History     Chief Complaint   Patient presents with    Shortness of Breath     X1 month worsening the last 2 days and worse with exertion      74-year-old male with a past medical history of COPD presents for evaluation of shortness of breath.  He reports that it has been going on for approximately a month.  He reports that it has been persistent during this time.  He reports that it is worse with exertion.  He denies any associated chest pain, fever, abdominal pain, nausea vomiting, or diarrhea.  He reports that it was relieved with his home albuterol and Trelegy, but he ran out of those a few weeks ago.  He does report an associated productive cough as well.  EMS reports that the patient was noted to be wheezing by them and they gave him an albuterol treatment, with improvement in it.  He reports that he continues to smoke and smokes approximately 1 pack a day.  He reports that he has been smoking daily for greater than 60 years.      Review of patient's allergies indicates:  No Known Allergies  Past Medical History:   Diagnosis Date    COPD (chronic obstructive pulmonary disease)      Past Surgical History:   Procedure Laterality Date    MOUTH SURGERY  2000     No family history on file.  Social History     Tobacco Use    Smoking status: Every Day     Types: Cigarettes    Smokeless tobacco: Never   Substance Use Topics    Alcohol use: Not Currently    Drug use: Never     Review of Systems   Constitutional:  Negative for chills, diaphoresis, fatigue and fever.   HENT:  Negative for congestion and rhinorrhea.    Respiratory:  Positive for cough and shortness of breath.    Cardiovascular:  Negative for chest pain.   Gastrointestinal:  Negative for abdominal pain, diarrhea, nausea and vomiting.   Genitourinary:  Negative for dysuria, frequency and testicular pain.   Musculoskeletal:  Negative for gait problem.   Skin:  Negative for color change.   Neurological:  Negative for dizziness  and numbness.   Psychiatric/Behavioral:  Negative for agitation and confusion.        Physical Exam     Initial Vitals [05/22/24 1033]   BP Pulse Resp Temp SpO2   (!) 162/81 70 (!) 22 97.6 °F (36.4 °C) (!) 92 %      MAP       --         Physical Exam    Nursing note and vitals reviewed.  Constitutional: He appears well-developed and well-nourished.   HENT:   Head: Normocephalic and atraumatic.   Eyes: EOM are normal. Pupils are equal, round, and reactive to light.   Neck: Neck supple.   Cardiovascular:  Normal rate and regular rhythm.           Pulmonary/Chest:   Coarse wheezing noted to bilateral lower lobes.   Abdominal: Abdomen is soft. Bowel sounds are normal. He exhibits no distension. There is no abdominal tenderness. There is no rebound and no guarding.   Musculoskeletal:         General: Normal range of motion.      Cervical back: Neck supple.     Neurological: He is alert and oriented to person, place, and time.   Skin: Skin is warm and dry.   Psychiatric: He has a normal mood and affect.         ED Course   Critical Care    Date/Time: 5/22/2024 1:03 PM    Performed by: Bryan Borrero MD  Authorized by: Bryan Borrero MD  Direct patient critical care time: 11 minutes  Ordering / reviewing critical care time: 10 minutes  Documentation critical care time: 11 minutes  Total critical care time (exclusive of procedural time) : 32 minutes  Critical care was time spent personally by me on the following activities: development of treatment plan with patient or surrogate, examination of patient, evaluation of patient's response to treatment, obtaining history from patient or surrogate, ordering and performing treatments and interventions, ordering and review of laboratory studies and ordering and review of radiographic studies.        Labs Reviewed   CBC W/ AUTO DIFFERENTIAL - Abnormal; Notable for the following components:       Result Value    RDW 14.7 (*)     All other components within normal limits    COMPREHENSIVE METABOLIC PANEL - Abnormal; Notable for the following components:    Anion Gap 6 (*)     All other components within normal limits   B-TYPE NATRIURETIC PEPTIDE   MAGNESIUM   TROPONIN I HIGH SENSITIVITY   MAGNESIUM   TROPONIN I HIGH SENSITIVITY     EKG Readings: (Independently Interpreted)   Initial Reading: No STEMI. Rhythm: Normal Sinus Rhythm. Heart Rate: 74. Ectopy: No Ectopy. Conduction: Normal. ST Segments: Normal ST Segments. T Waves: Normal. T Waves Flipped: II, III and AVF. Axis: Right Axis Deviation. Clinical Impression: Normal Sinus Rhythm Other Impression: Incomplete right bundle branch block.  Biphasic T-waves noted in leads V3, V4, V5, and V6.     ECG Results              EKG 12-lead (In process)        Collection Time Result Time QRS Duration OHS QTC Calculation    05/22/24 10:39:28 05/22/24 10:58:58 102 424                     In process by Interface, Lab In [facility] (05/22/24 10:59:01)                   Narrative:    Test Reason : R06.02,    Vent. Rate : 074 BPM     Atrial Rate : 000 BPM     P-R Int : 000 ms          QRS Dur : 102 ms      QT Int : 382 ms       P-R-T Axes : 000 091 -72 degrees     QTc Int : 424 ms    Accelerated Junctional rhythm  Rightward axis  Incomplete right bundle branch block  Septal infarct ,age undetermined  T wave abnormality, consider inferior ischemia  Abnormal ECG  When compared with ECG of 06-JUL-2021 11:40,  Junctional rhythm has replaced Sinus rhythm  Septal infarct is now Present  T wave inversion now evident in Inferior leads  T wave inversion now evident in Anterior leads    Referred By: [email or web address]   SELF           Confirmed By:                                   Imaging Results              X-Ray Chest AP (Final result)  Result time 05/22/24 11:07:43      Final result by Deepak Franco MD (05/22/24 11:07:43)                   Impression:      No evidence of acute cardiopulmonary disease.      Electronically signed by: Deepak  Salvador  Date:    05/22/2024  Time:    11:07               Narrative:    EXAMINATION:  XR CHEST AP PORTABLE    CLINICAL HISTORY:  Dyspnea    FINDINGS:  Portable chest radiograph at 10:58 hours compared to 07/06/2021 shows the cardiomediastinal silhouette and pulmonary vasculature are within normal limits.    The lungs are normally expanded, with no consolidation, large pleural effusion, or evidence of pulmonary edema.  Linear atelectasis or scarring in the left lower lung is unchanged, with scattered lucencies suggesting emphysema.  No pneumothorax or acute fracture.                                       Medications   albuterol-ipratropium 2.5 mg-0.5 mg/3 mL nebulizer solution 3 mL (3 mLs Nebulization Given 5/22/24 1106)   methylPREDNISolone sodium succinate injection 125 mg (125 mg Intravenous Given 5/22/24 1116)     Medical Decision Making  74-year-old male presents with shortness of breath.    Initial differential diagnosis included but not limited to COPD exacerbation, pneumonia, and atypical MI.    Amount and/or Complexity of Data Reviewed  Labs: ordered.  Radiology: ordered.  ECG/medicine tests: ordered.    Risk  Prescription drug management.  Risk Details: The patient was emergently evaluated in the emergency department, his evaluation was significant for an elderly male an abnormal lung exam.  The patient's EKG showed T-wave inversions in leads 2 3 and AVF.  The patient's chest x-ray showed no acute abnormalities per Radiology.  The patient's labs showed no acute processes.  The patient was treated with a respiratory nebulizer treatment, and IV Solu-Medrol.  The patient reports improvement in his symptoms after treatment.  The etiology of his symptoms is likely a COPD exacerbation.  The patient is stable for discharge to home and does not require further care or workup at this time.  He will be discharged home with a p.o. prednisone burst.  Additionally, I will refill his home Trelegy as well as his home  albuterol/Atrovent nebulizer treatments.  He is referred to primary care for follow-up.                                      Clinical Impression:  Final diagnoses:  [R06.02] SOB (shortness of breath)  [J44.1] COPD exacerbation (Primary)          ED Disposition Condition    Discharge Stable          ED Prescriptions       Medication Sig Dispense Start Date End Date Auth. Provider    predniSONE (DELTASONE) 20 MG tablet Take 2 tablets (40 mg total) by mouth once daily. for 5 days 10 tablet 5/22/2024 5/27/2024 Bryan Borrero MD    fluticasone-umeclidin-vilanter (TRELEGY ELLIPTA) 100-62.5-25 mcg DsDv Inhale 1 puff into the lungs once daily. 28 each 5/22/2024 5/22/2025 Bryan Borrero MD    albuterol-ipratropium (DUO-NEB) 2.5 mg-0.5 mg/3 mL nebulizer solution Take 3 mLs by nebulization every 6 (six) hours as needed for Wheezing. Rescue 75 mL 5/22/2024 -- Bryan Borrero MD          Follow-up Information       Follow up With Specialties Details Why Contact Info    Jovita Macias, NP Family Medicine Schedule an appointment as soon as possible for a visit   [address] [place] [address]  [phone]               Bryan Borrero MD  05/22/24 8995

## 2024-05-22 NOTE — CARE UPDATE
05/22/24 1106   Patient Assessment/Suction   Level of Consciousness (AVPU) alert   Respiratory Effort Short of breath  (on exertion)   Expansion/Accessory Muscles/Retractions no use of accessory muscles   All Lung Fields Breath Sounds Anterior:;Posterior:;wheezes, expiratory   Rhythm/Pattern, Respiratory shortness of breath  (on exertion)   Cough Frequency infrequent   Cough Type productive   Secretions Amount small   Secretions Color white   Secretions Characteristics thick   PRE-TX-O2   Device (Oxygen Therapy) room air   SpO2 95 %   Pulse Oximetry Type Continuous   $ Pulse Oximetry - Multiple Charge Pulse Oximetry - Multiple   Pulse 71   Resp (!) 22   Aerosol Therapy   $ Aerosol Therapy Charges Aerosol Treatment   Daily Review of Necessity (SVN) completed   Respiratory Treatment Status (SVN) given   Treatment Route (SVN) air;mouthpiece utilized   Patient Position HOB elevated   Post Treatment Assessment (SVN) breath sounds improved;increased aeration   Signs of Intolerance (SVN) none   Breath Sounds Post-Respiratory Treatment   Throughout All Fields Post-Treatment All Fields   Throughout All Fields Post-Treatment aeration increased   Post-treatment Heart Rate (beats/min) 76   Post-treatment Resp Rate (breaths/min) 22   Education   $ Education Bronchodilator;15 min

## 2024-06-04 ENCOUNTER — TELEPHONE (OUTPATIENT)
Dept: PRIMARY CARE CLINIC | Facility: CLINIC | Age: 74
End: 2024-06-04
Payer: MEDICARE

## 2024-06-04 NOTE — TELEPHONE ENCOUNTER
LOV--2-17-23, appointment required.  Patient noted to have a hospital follow up appointment scheduled on tomorrow's date with Dr. Shin.   Writer placed call to patient to discuss refill request.  No answer received.  Left voicemail requesting return call to office.

## 2024-06-04 NOTE — TELEPHONE ENCOUNTER
Shira Rice [facility] Staff     ----- Message from Shira Rice sent at 6/4/2024 12:46 PM CDT -----  Contact: self  Type:  RX Refill Request    Who Called:  the patient  Refill or New Rx:  refill    RX Name and Strength:    albuterol-ipratropium (DUO-NEB) 2.5 mg-0.5 mg/3 mL nebulizer solution    fluticasone-umeclidin-vilanter (TRELEGY ELLIPTA) 100-62.5-25 mcg DsDv    albuterol (PROVENTIL HFA) 90 mcg/actuation inhaler  How is the patient currently taking it? (ex. 1XDay):  as directed  Is this a 30 day or 90 day RX:  30  Preferred Pharmacy with phone number:      [id number] - [place] - [address]  [place] [address]  Phone: [phone] Fax: [phone]       Local or Mail Order:  local  Ordering Provider:  Gilberto  Best Call Back Number:  [phone]  Additional Information:  pt would like a refill, but does not know who to ask them from, Please call.

## 2024-06-12 LAB
OHS QRS DURATION: 102 MS
OHS QTC CALCULATION: 424 MS

## 2024-08-09 ENCOUNTER — OFFICE VISIT (OUTPATIENT)
Dept: FAMILY MEDICINE | Facility: CLINIC | Age: 74
End: 2024-08-09
Payer: MEDICARE

## 2024-08-09 VITALS
DIASTOLIC BLOOD PRESSURE: 70 MMHG | SYSTOLIC BLOOD PRESSURE: 118 MMHG | OXYGEN SATURATION: 91 % | TEMPERATURE: 98 F | WEIGHT: 123 LBS | HEIGHT: 74 IN | HEART RATE: 76 BPM | RESPIRATION RATE: 20 BRPM | BODY MASS INDEX: 15.78 KG/M2

## 2024-08-09 DIAGNOSIS — R63.6 UNDERWEIGHT: ICD-10-CM

## 2024-08-09 DIAGNOSIS — Z87.891 PERSONAL HISTORY OF NICOTINE DEPENDENCE: ICD-10-CM

## 2024-08-09 DIAGNOSIS — J43.9 PULMONARY EMPHYSEMA, UNSPECIFIED EMPHYSEMA TYPE: Primary | ICD-10-CM

## 2024-08-09 DIAGNOSIS — Z12.2 SCREENING FOR LUNG CANCER: ICD-10-CM

## 2024-08-09 DIAGNOSIS — R63.4 ABNORMAL WEIGHT LOSS: ICD-10-CM

## 2024-08-09 DIAGNOSIS — Z72.0 TOBACCO ABUSE: ICD-10-CM

## 2024-08-09 DIAGNOSIS — Z12.11 SCREENING FOR COLON CANCER: ICD-10-CM

## 2024-08-09 PROCEDURE — 99999 PR PBB SHADOW E&M-EST. PATIENT-LVL IV: CPT | Mod: PBBFAC,,,

## 2024-08-09 RX ORDER — FLUTICASONE FUROATE, UMECLIDINIUM BROMIDE AND VILANTEROL TRIFENATATE 100; 62.5; 25 UG/1; UG/1; UG/1
1 POWDER RESPIRATORY (INHALATION) DAILY
Qty: 28 EACH | Refills: 2 | Status: SHIPPED | OUTPATIENT
Start: 2024-08-09 | End: 2024-08-09

## 2024-08-09 RX ORDER — FLUTICASONE FUROATE, UMECLIDINIUM BROMIDE AND VILANTEROL TRIFENATATE 100; 62.5; 25 UG/1; UG/1; UG/1
1 POWDER RESPIRATORY (INHALATION) DAILY
Qty: 28 EACH | Refills: 2 | Status: SHIPPED | OUTPATIENT
Start: 2024-08-09

## 2024-08-09 RX ORDER — ALBUTEROL SULFATE 90 UG/1
2 INHALANT RESPIRATORY (INHALATION) EVERY 6 HOURS PRN
Qty: 6.7 G | Refills: 5 | Status: SHIPPED | OUTPATIENT
Start: 2024-08-09 | End: 2025-08-09

## 2024-10-24 ENCOUNTER — PATIENT MESSAGE (OUTPATIENT)
Dept: RESEARCH | Facility: HOSPITAL | Age: 74
End: 2024-10-24
Payer: MEDICARE

## 2024-11-25 ENCOUNTER — HOSPITAL ENCOUNTER (INPATIENT)
Facility: HOSPITAL | Age: 74
LOS: 2 days | Discharge: HOME HEALTH | DRG: 871 | End: 2024-11-27
Attending: EMERGENCY MEDICINE | Admitting: HOSPITALIST
Payer: MEDICARE

## 2024-11-25 DIAGNOSIS — R06.02 SOB (SHORTNESS OF BREATH): ICD-10-CM

## 2024-11-25 DIAGNOSIS — J44.1 COPD EXACERBATION: Primary | ICD-10-CM

## 2024-11-25 DIAGNOSIS — J18.9 PNEUMONIA OF LEFT LOWER LOBE DUE TO INFECTIOUS ORGANISM: ICD-10-CM

## 2024-11-25 PROBLEM — A41.9 SEVERE SEPSIS: Status: ACTIVE | Noted: 2024-11-25

## 2024-11-25 PROBLEM — J96.01 ACUTE HYPOXIC RESPIRATORY FAILURE: Status: ACTIVE | Noted: 2024-11-25

## 2024-11-25 PROBLEM — F17.200 TOBACCO DEPENDENCY: Status: ACTIVE | Noted: 2024-11-25

## 2024-11-25 PROBLEM — R65.20 SEVERE SEPSIS: Status: ACTIVE | Noted: 2024-11-25

## 2024-11-25 LAB
ALBUMIN SERPL BCP-MCNC: 3.8 G/DL (ref 3.5–5.2)
ALLENS TEST: ABNORMAL
ALP SERPL-CCNC: 63 U/L (ref 55–135)
ALT SERPL W/O P-5'-P-CCNC: 14 U/L (ref 10–44)
ANION GAP SERPL CALC-SCNC: 5 MMOL/L (ref 8–16)
AST SERPL-CCNC: 21 U/L (ref 10–40)
BACTERIA #/AREA URNS HPF: NORMAL /HPF
BASOPHILS # BLD AUTO: 0.05 K/UL (ref 0–0.2)
BASOPHILS NFR BLD: 0.2 % (ref 0–1.9)
BILIRUB SERPL-MCNC: 0.8 MG/DL (ref 0.1–1)
BILIRUB UR QL STRIP: NEGATIVE
BNP SERPL-MCNC: 44 PG/ML (ref 0–99)
BUN SERPL-MCNC: 19 MG/DL (ref 8–23)
CALCIUM SERPL-MCNC: 8.6 MG/DL (ref 8.7–10.5)
CHLORIDE SERPL-SCNC: 97 MMOL/L (ref 95–110)
CLARITY UR: CLEAR
CO2 SERPL-SCNC: 31 MMOL/L (ref 23–29)
COLOR UR: YELLOW
CREAT SERPL-MCNC: 0.9 MG/DL (ref 0.5–1.4)
D DIMER PPP IA.FEU-MCNC: 1.74 MG/L FEU (ref 0–0.49)
DELSYS: ABNORMAL
DIFFERENTIAL METHOD BLD: ABNORMAL
EOSINOPHIL # BLD AUTO: 0 K/UL (ref 0–0.5)
EOSINOPHIL NFR BLD: 0 % (ref 0–8)
ERYTHROCYTE [DISTWIDTH] IN BLOOD BY AUTOMATED COUNT: 13.8 % (ref 11.5–14.5)
EST. GFR  (NO RACE VARIABLE): >60 ML/MIN/1.73 M^2
FLOW: 3
GLUCOSE SERPL-MCNC: 126 MG/DL (ref 70–110)
GLUCOSE UR QL STRIP: ABNORMAL
HCO3 UR-SCNC: 30.5 MMOL/L (ref 24–28)
HCT VFR BLD AUTO: 42 % (ref 40–54)
HCV AB SERPL QL IA: NEGATIVE
HGB BLD-MCNC: 13.7 G/DL (ref 14–18)
HGB UR QL STRIP: NEGATIVE
HIV 1+2 AB+HIV1 P24 AG SERPL QL IA: NEGATIVE
IMM GRANULOCYTES # BLD AUTO: 0.14 K/UL (ref 0–0.04)
IMM GRANULOCYTES NFR BLD AUTO: 0.7 % (ref 0–0.5)
INFLUENZA A, MOLECULAR: NEGATIVE
INFLUENZA B, MOLECULAR: NEGATIVE
KETONES UR QL STRIP: NEGATIVE
LDH SERPL L TO P-CCNC: 0.87 MMOL/L (ref 0.5–2.2)
LEUKOCYTE ESTERASE UR QL STRIP: NEGATIVE
LYMPHOCYTES # BLD AUTO: 0.4 K/UL (ref 1–4.8)
LYMPHOCYTES NFR BLD: 1.7 % (ref 18–48)
MCH RBC QN AUTO: 30 PG (ref 27–31)
MCHC RBC AUTO-ENTMCNC: 32.6 G/DL (ref 32–36)
MCV RBC AUTO: 92 FL (ref 82–98)
MICROSCOPIC COMMENT: NORMAL
MODE: ABNORMAL
MONOCYTES # BLD AUTO: 1.2 K/UL (ref 0.3–1)
MONOCYTES NFR BLD: 5.8 % (ref 4–15)
NEUTROPHILS # BLD AUTO: 18.9 K/UL (ref 1.8–7.7)
NEUTROPHILS NFR BLD: 91.6 % (ref 38–73)
NITRITE UR QL STRIP: NEGATIVE
NRBC BLD-RTO: 0 /100 WBC
PCO2 BLDA: 53.6 MMHG (ref 35–45)
PH SMN: 7.36 [PH] (ref 7.35–7.45)
PH UR STRIP: 6 [PH] (ref 5–8)
PLATELET # BLD AUTO: 337 K/UL (ref 150–450)
PMV BLD AUTO: 9.4 FL (ref 9.2–12.9)
PO2 BLDA: 107 MMHG (ref 80–100)
POC BE: 5 MMOL/L
POC SATURATED O2: 98 % (ref 95–100)
POC TCO2: 32 MMOL/L (ref 23–27)
POTASSIUM SERPL-SCNC: 4.5 MMOL/L (ref 3.5–5.1)
PROCALCITONIN SERPL IA-MCNC: 0.7 NG/ML (ref 0–0.5)
PROT SERPL-MCNC: 6.5 G/DL (ref 6–8.4)
PROT UR QL STRIP: NEGATIVE
RBC # BLD AUTO: 4.57 M/UL (ref 4.6–6.2)
RBC #/AREA URNS HPF: 2 /HPF (ref 0–4)
SAMPLE: ABNORMAL
SAMPLE: NORMAL
SARS-COV-2 RDRP RESP QL NAA+PROBE: NEGATIVE
SITE: ABNORMAL
SODIUM SERPL-SCNC: 133 MMOL/L (ref 136–145)
SP GR UR STRIP: 1.02 (ref 1–1.03)
SPECIMEN SOURCE: NORMAL
SQUAMOUS #/AREA URNS HPF: 0 /HPF
TROPONIN I SERPL HS-MCNC: 10.6 PG/ML (ref 0–14.9)
TROPONIN I SERPL HS-MCNC: 20.2 PG/ML (ref 0–14.9)
URN SPEC COLLECT METH UR: ABNORMAL
UROBILINOGEN UR STRIP-ACNC: NEGATIVE EU/DL
WBC # BLD AUTO: 20.66 K/UL (ref 3.9–12.7)
WBC #/AREA URNS HPF: 1 /HPF (ref 0–5)
YEAST URNS QL MICRO: NORMAL

## 2024-11-25 PROCEDURE — 99900035 HC TECH TIME PER 15 MIN (STAT)

## 2024-11-25 PROCEDURE — 36415 COLL VENOUS BLD VENIPUNCTURE: CPT | Performed by: HOSPITALIST

## 2024-11-25 PROCEDURE — 63600175 PHARM REV CODE 636 W HCPCS: Performed by: HOSPITALIST

## 2024-11-25 PROCEDURE — 96361 HYDRATE IV INFUSION ADD-ON: CPT

## 2024-11-25 PROCEDURE — 94644 CONT INHLJ TX 1ST HOUR: CPT

## 2024-11-25 PROCEDURE — 94799 UNLISTED PULMONARY SVC/PX: CPT

## 2024-11-25 PROCEDURE — 87040 BLOOD CULTURE FOR BACTERIA: CPT | Performed by: EMERGENCY MEDICINE

## 2024-11-25 PROCEDURE — 25000003 PHARM REV CODE 250: Performed by: HOSPITALIST

## 2024-11-25 PROCEDURE — 87502 INFLUENZA DNA AMP PROBE: CPT | Performed by: EMERGENCY MEDICINE

## 2024-11-25 PROCEDURE — 85025 COMPLETE CBC W/AUTO DIFF WBC: CPT | Performed by: EMERGENCY MEDICINE

## 2024-11-25 PROCEDURE — 94761 N-INVAS EAR/PLS OXIMETRY MLT: CPT

## 2024-11-25 PROCEDURE — S4991 NICOTINE PATCH NONLEGEND: HCPCS | Performed by: HOSPITALIST

## 2024-11-25 PROCEDURE — 25000242 PHARM REV CODE 250 ALT 637 W/ HCPCS: Performed by: HOSPITALIST

## 2024-11-25 PROCEDURE — 87635 SARS-COV-2 COVID-19 AMP PRB: CPT | Performed by: EMERGENCY MEDICINE

## 2024-11-25 PROCEDURE — 99285 EMERGENCY DEPT VISIT HI MDM: CPT | Mod: 25

## 2024-11-25 PROCEDURE — 96365 THER/PROPH/DIAG IV INF INIT: CPT

## 2024-11-25 PROCEDURE — 99900031 HC PATIENT EDUCATION (STAT)

## 2024-11-25 PROCEDURE — 86803 HEPATITIS C AB TEST: CPT | Performed by: EMERGENCY MEDICINE

## 2024-11-25 PROCEDURE — 12000002 HC ACUTE/MED SURGE SEMI-PRIVATE ROOM

## 2024-11-25 PROCEDURE — 82803 BLOOD GASES ANY COMBINATION: CPT

## 2024-11-25 PROCEDURE — 96375 TX/PRO/DX INJ NEW DRUG ADDON: CPT

## 2024-11-25 PROCEDURE — 84484 ASSAY OF TROPONIN QUANT: CPT | Performed by: EMERGENCY MEDICINE

## 2024-11-25 PROCEDURE — 25000242 PHARM REV CODE 250 ALT 637 W/ HCPCS: Performed by: EMERGENCY MEDICINE

## 2024-11-25 PROCEDURE — 81001 URINALYSIS AUTO W/SCOPE: CPT | Performed by: EMERGENCY MEDICINE

## 2024-11-25 PROCEDURE — 83880 ASSAY OF NATRIURETIC PEPTIDE: CPT | Performed by: EMERGENCY MEDICINE

## 2024-11-25 PROCEDURE — 93005 ELECTROCARDIOGRAM TRACING: CPT | Performed by: GENERAL PRACTICE

## 2024-11-25 PROCEDURE — 25000003 PHARM REV CODE 250: Performed by: EMERGENCY MEDICINE

## 2024-11-25 PROCEDURE — 87389 HIV-1 AG W/HIV-1&-2 AB AG IA: CPT | Performed by: EMERGENCY MEDICINE

## 2024-11-25 PROCEDURE — 27000221 HC OXYGEN, UP TO 24 HOURS

## 2024-11-25 PROCEDURE — 25500020 PHARM REV CODE 255: Performed by: HOSPITALIST

## 2024-11-25 PROCEDURE — 84145 PROCALCITONIN (PCT): CPT | Performed by: EMERGENCY MEDICINE

## 2024-11-25 PROCEDURE — 63600175 PHARM REV CODE 636 W HCPCS: Performed by: EMERGENCY MEDICINE

## 2024-11-25 PROCEDURE — 94640 AIRWAY INHALATION TREATMENT: CPT

## 2024-11-25 PROCEDURE — 85379 FIBRIN DEGRADATION QUANT: CPT | Performed by: HOSPITALIST

## 2024-11-25 PROCEDURE — 80053 COMPREHEN METABOLIC PANEL: CPT | Performed by: EMERGENCY MEDICINE

## 2024-11-25 PROCEDURE — 84484 ASSAY OF TROPONIN QUANT: CPT | Mod: 91 | Performed by: EMERGENCY MEDICINE

## 2024-11-25 PROCEDURE — 36415 COLL VENOUS BLD VENIPUNCTURE: CPT | Performed by: EMERGENCY MEDICINE

## 2024-11-25 PROCEDURE — 36600 WITHDRAWAL OF ARTERIAL BLOOD: CPT

## 2024-11-25 PROCEDURE — 93010 ELECTROCARDIOGRAM REPORT: CPT | Mod: ,,, | Performed by: GENERAL PRACTICE

## 2024-11-25 RX ORDER — IBUPROFEN 200 MG
1 TABLET ORAL DAILY
Status: DISCONTINUED | OUTPATIENT
Start: 2024-11-25 | End: 2024-11-27 | Stop reason: HOSPADM

## 2024-11-25 RX ORDER — PREDNISONE 20 MG/1
40 TABLET ORAL DAILY
Status: DISCONTINUED | OUTPATIENT
Start: 2024-11-25 | End: 2024-11-25

## 2024-11-25 RX ORDER — SODIUM CHLORIDE 0.9 % (FLUSH) 0.9 %
3 SYRINGE (ML) INJECTION
Status: DISCONTINUED | OUTPATIENT
Start: 2024-11-25 | End: 2024-11-27 | Stop reason: HOSPADM

## 2024-11-25 RX ORDER — ENOXAPARIN SODIUM 100 MG/ML
40 INJECTION SUBCUTANEOUS EVERY 24 HOURS
Status: DISCONTINUED | OUTPATIENT
Start: 2024-11-25 | End: 2024-11-27 | Stop reason: HOSPADM

## 2024-11-25 RX ORDER — DOXYCYCLINE 100 MG/1
100 CAPSULE ORAL EVERY 12 HOURS
Status: DISCONTINUED | OUTPATIENT
Start: 2024-11-25 | End: 2024-11-27 | Stop reason: HOSPADM

## 2024-11-25 RX ORDER — SODIUM CHLORIDE 9 MG/ML
INJECTION, SOLUTION INTRAVENOUS CONTINUOUS
Status: DISCONTINUED | OUTPATIENT
Start: 2024-11-25 | End: 2024-11-26

## 2024-11-25 RX ORDER — SODIUM CHLORIDE, SODIUM LACTATE, POTASSIUM CHLORIDE, CALCIUM CHLORIDE 600; 310; 30; 20 MG/100ML; MG/100ML; MG/100ML; MG/100ML
1000 INJECTION, SOLUTION INTRAVENOUS
Status: COMPLETED | OUTPATIENT
Start: 2024-11-25 | End: 2024-11-25

## 2024-11-25 RX ORDER — LEVALBUTEROL INHALATION SOLUTION 1.25 MG/3ML
3.75 SOLUTION RESPIRATORY (INHALATION)
Status: COMPLETED | OUTPATIENT
Start: 2024-11-25 | End: 2024-11-25

## 2024-11-25 RX ORDER — IPRATROPIUM BROMIDE AND ALBUTEROL SULFATE 2.5; .5 MG/3ML; MG/3ML
3 SOLUTION RESPIRATORY (INHALATION)
Status: DISCONTINUED | OUTPATIENT
Start: 2024-11-25 | End: 2024-11-25

## 2024-11-25 RX ORDER — IPRATROPIUM BROMIDE AND ALBUTEROL SULFATE 2.5; .5 MG/3ML; MG/3ML
3 SOLUTION RESPIRATORY (INHALATION)
Status: DISCONTINUED | OUTPATIENT
Start: 2024-11-25 | End: 2024-11-27

## 2024-11-25 RX ORDER — METHYLPREDNISOLONE SOD SUCC 125 MG
125 VIAL (EA) INJECTION
Status: COMPLETED | OUTPATIENT
Start: 2024-11-25 | End: 2024-11-25

## 2024-11-25 RX ORDER — ARFORMOTEROL TARTRATE 15 UG/2ML
15 SOLUTION RESPIRATORY (INHALATION) 2 TIMES DAILY
Status: DISCONTINUED | OUTPATIENT
Start: 2024-11-25 | End: 2024-11-27 | Stop reason: HOSPADM

## 2024-11-25 RX ORDER — IPRATROPIUM BROMIDE 0.5 MG/2.5ML
0.5 SOLUTION RESPIRATORY (INHALATION) EVERY 6 HOURS
Status: DISCONTINUED | OUTPATIENT
Start: 2024-11-25 | End: 2024-11-25

## 2024-11-25 RX ORDER — CEFTRIAXONE 1 G/1
1 INJECTION, POWDER, FOR SOLUTION INTRAMUSCULAR; INTRAVENOUS
Status: DISCONTINUED | OUTPATIENT
Start: 2024-11-25 | End: 2024-11-27 | Stop reason: HOSPADM

## 2024-11-25 RX ORDER — BUDESONIDE 0.5 MG/2ML
0.5 INHALANT ORAL EVERY 12 HOURS
Status: DISCONTINUED | OUTPATIENT
Start: 2024-11-25 | End: 2024-11-27 | Stop reason: HOSPADM

## 2024-11-25 RX ADMIN — IPRATROPIUM BROMIDE AND ALBUTEROL SULFATE 3 ML: .5; 3 SOLUTION RESPIRATORY (INHALATION) at 04:11

## 2024-11-25 RX ADMIN — DOXYCYCLINE HYCLATE 100 MG: 100 CAPSULE ORAL at 09:11

## 2024-11-25 RX ADMIN — ENOXAPARIN SODIUM 40 MG: 40 INJECTION SUBCUTANEOUS at 04:11

## 2024-11-25 RX ADMIN — ARFORMOTEROL TARTRATE 15 MCG: 15 SOLUTION RESPIRATORY (INHALATION) at 07:11

## 2024-11-25 RX ADMIN — IPRATROPIUM BROMIDE AND ALBUTEROL SULFATE 3 ML: .5; 3 SOLUTION RESPIRATORY (INHALATION) at 07:11

## 2024-11-25 RX ADMIN — LEVALBUTEROL HYDROCHLORIDE 3.75 MG: 1.25 SOLUTION RESPIRATORY (INHALATION) at 07:11

## 2024-11-25 RX ADMIN — SODIUM CHLORIDE, POTASSIUM CHLORIDE, SODIUM LACTATE AND CALCIUM CHLORIDE 1000 ML: 600; 310; 30; 20 INJECTION, SOLUTION INTRAVENOUS at 09:11

## 2024-11-25 RX ADMIN — CEFTRIAXONE 1 G: 1 INJECTION, POWDER, FOR SOLUTION INTRAMUSCULAR; INTRAVENOUS at 12:11

## 2024-11-25 RX ADMIN — NICOTINE 1 PATCH: 14 PATCH, EXTENDED RELEASE TRANSDERMAL at 04:11

## 2024-11-25 RX ADMIN — SODIUM CHLORIDE, POTASSIUM CHLORIDE, SODIUM LACTATE AND CALCIUM CHLORIDE 1000 ML: 600; 310; 30; 20 INJECTION, SOLUTION INTRAVENOUS at 07:11

## 2024-11-25 RX ADMIN — SODIUM CHLORIDE: 9 INJECTION, SOLUTION INTRAVENOUS at 09:11

## 2024-11-25 RX ADMIN — METHYLPREDNISOLONE SODIUM SUCCINATE 125 MG: 125 INJECTION, POWDER, FOR SOLUTION INTRAMUSCULAR; INTRAVENOUS at 07:11

## 2024-11-25 RX ADMIN — IPRATROPIUM BROMIDE AND ALBUTEROL SULFATE 3 ML: .5; 3 SOLUTION RESPIRATORY (INHALATION) at 12:11

## 2024-11-25 RX ADMIN — DOXYCYCLINE 100 MG: 100 INJECTION, POWDER, LYOPHILIZED, FOR SOLUTION INTRAVENOUS at 09:11

## 2024-11-25 RX ADMIN — IOHEXOL 100 ML: 350 INJECTION, SOLUTION INTRAVENOUS at 01:11

## 2024-11-25 RX ADMIN — BUDESONIDE INHALATION 0.5 MG: 0.5 SUSPENSION RESPIRATORY (INHALATION) at 07:11

## 2024-11-25 NOTE — ASSESSMENT & PLAN NOTE
Patient was given doxycycline.  Continue doxycycline add ceftriaxone.  Sputum culture.  Blood cultures collected.  Primary team to follow result.      Microbiology Results (last 7 days)       Procedure Component Value Units Date/Time    Blood culture x two cultures. Draw prior to antibiotics. [[id number]] Collected: 11/25/24 0837    Order Status: Sent Specimen: Blood from Peripheral, Antecubital, Right Updated: 11/25/24 0847    Blood culture x two cultures. Draw prior to antibiotics. [[id number]] Collected: 11/25/24 0840    Order Status: Sent Specimen: Blood from Peripheral, Hand, Right Updated: 11/25/24 0847

## 2024-11-25 NOTE — HPI
73 yo male with PMH of COPD not on home oxygen presented to the ER because of shortness of breath.  Patient has been having progressive shortness on breath over the last week after running out of his inhaler.  Also wheezing, productive cough of white sputum.  He denied any fever or chills.  Denied any sore throat, chest pain. He still actively smokes 1 ppd.    In the ER, vitals showed bp of 127/76, HR of 113, respiratory rate of 44, afebrile satting 93 percent on room air.  CBC showed a white count of 20.  CMP with hyponatremia of 133.  Procalcitonin is elevated at 0.7.  CXR showed emphysematous lung disease, and focal hazy opacity of the left lung could reflect an infiltrate.  EKG shows sinus tachycardia.  Patient was found to be in COPD exacerbation and was given methylprednisolone, and neb treatments.  He was also started on doxycycline for pneumonia, and was given 2 L of lactated ringer for hypotension with blood pressure dropping to the 80s systolically.  Patient's oxygen dropped as low as 89% on room air per the ER provider, and so was placed on 2 L nasal cannula.  He will be admitted to Medicine for COPD exacerbation and pneumonia.       Goal Outcome Evaluation:  Plan of Care Reviewed With: patient     Pt doing well. VSS. Pain well controlled with ERAS meds. Fundus and lochia WNL. Dressing with scant drainage, area marked. PONV, zofran given in L/D and sips clear liquid given. Improved with rest and positioning. F/C in place. 2nd bag of Pitocin infusing. Breastfeeding well. No concerns at this time.      Progress: improving

## 2024-11-25 NOTE — NURSING
UPDATE- Phone found in ED. Someone will deliver to patient      Called ED and spoke with ChargeLicha.   Pt states he had a cell phone in the ED but we could not locate it in the room. Charge nurse spoke with his nurse Janina Barfield who states no phone. The tech states it was not left in the bed. It was stripped Lillian.

## 2024-11-25 NOTE — CARE UPDATE
11/25/24 1226   Patient Assessment/Suction   Level of Consciousness (AVPU) alert   Respiratory Effort Mild;Labored   Expansion/Accessory Muscles/Retractions accessory muscle use   All Lung Fields Breath Sounds coarse   Rhythm/Pattern, Respiratory shallow;tachypneic  (instructed on deep breathing technique)   Cough Frequency infrequent   Cough Type congested;nonproductive   Sputum Collection unable to obtain sample   PRE-TX-O2   Device (Oxygen Therapy) nasal cannula   Flow (L/min) (Oxygen Therapy) 3   SpO2 95 %   Pulse 91   Resp (!) 28   BP (!) 107/59   Aerosol Therapy   $ Aerosol Therapy Charges Aerosol Treatment   Daily Review of Necessity (SVN) completed   Respiratory Treatment Status (SVN) given   Treatment Route (SVN) mask;oxygen   Patient Position HOB elevated   Post Treatment Assessment (SVN) increased aeration   Signs of Intolerance (SVN) none   Breath Sounds Post-Respiratory Treatment   Throughout All Fields Post-Treatment All Fields   Throughout All Fields Post-Treatment aeration increased   Post-treatment Heart Rate (beats/min) 94   Post-treatment Resp Rate (breaths/min) 29   Education   $ Education Bronchodilator;Oxygen;15 min

## 2024-11-25 NOTE — ASSESSMENT & PLAN NOTE
This patient does have evidence of infective focus  My overall impression is sepsis.  Source: Respiratory, pneumonia.  Patient was given doxycycline.  I will also add ceftriaxone.  Sputum culture.  Continue oxygen supplement for his hypoxia.    Latest lactate reviewed-  Recent Labs   Lab 11/25/24  0857   POCLAC 0.87     Organ dysfunction indicated by Acute respiratory failure    Fluid challenge:  Patient was given the sepsis IV bolus protocol for resuscitation.

## 2024-11-25 NOTE — ASSESSMENT & PLAN NOTE
Patient received methylprednisolone, and levo albuterol treatment.    I will start DuoNeb q.4 hours while awake, continue with steroid daily.

## 2024-11-25 NOTE — ASSESSMENT & PLAN NOTE
Lowest SpO2 was 89% on room air, 2 L nasal cannula was applied.  Continue oxygen support.  Wean as tolerated.  Patient will likely need home oxygen evaluation prior to his discharge.  Suspect 2nd to COPD exacerbation, pneumonia. See plan of care for each for further management.  Will order d-dimer, if elevated, will do CTA chest.   Covid/flu - negative.

## 2024-11-25 NOTE — SUBJECTIVE & OBJECTIVE
Past Medical History:   Diagnosis Date    COPD (chronic obstructive pulmonary disease)        Past Surgical History:   Procedure Laterality Date    MOUTH SURGERY  2000       Review of patient's allergies indicates:  No Known Allergies    No current facility-administered medications on file prior to encounter.     Current Outpatient Medications on File Prior to Encounter   Medication Sig    albuterol (PROVENTIL HFA) 90 mcg/actuation inhaler Inhale 2 puffs into the lungs every 6 (six) hours as needed for Wheezing. Rescue    aspirin/salicylamide/caffeine (BC HEADACHE POWDER ORAL) Take 1 packet by mouth as needed.    fluticasone-umeclidin-vilanter (TRELEGY ELLIPTA) 100-62.5-25 mcg DsDv Inhale 1 puff into the lungs once daily.    albuterol-ipratropium (DUO-NEB) 2.5 mg-0.5 mg/3 mL nebulizer solution Take 3 mLs by nebulization every 6 (six) hours as needed for Wheezing. Rescue     Family History    None       Tobacco Use    Smoking status: Every Day     Types: Cigarettes    Smokeless tobacco: Never   Substance and Sexual Activity    Alcohol use: Not Currently    Drug use: Never    Sexual activity: Not on file     Review of Systems   Constitutional:  Negative for chills and fever.   HENT:  Negative for sore throat.    Eyes:  Negative for visual disturbance.   Respiratory:  Positive for cough, shortness of breath and wheezing.    Cardiovascular:  Negative for chest pain and palpitations.   Gastrointestinal:  Negative for abdominal pain, nausea and vomiting.   Endocrine: Negative for polydipsia and polyuria.   Genitourinary:  Negative for dysuria, frequency and urgency.   Skin:  Negative for rash.   Neurological:  Negative for tremors.   Psychiatric/Behavioral:  Negative for confusion.      Objective:     Vital Signs (Most Recent):  Temp: 99.3 °F (37.4 °C) (11/25/24 0654)  Pulse: 86 (11/25/24 0930)  Resp: (!) 53 (11/25/24 0930)  BP: (!) 100/51 (11/25/24 0930)  SpO2: (!) 93 % (11/25/24 0930) Vital Signs (24h Range):  Temp:   [99.3 °F (37.4 °C)] 99.3 °F (37.4 °C)  Pulse:  [] 86  Resp:  [20-53] 53  SpO2:  [90 %-97 %] 93 %  BP: ()/(50-76) 100/51     Weight: 53.1 kg (117 lb)  Body mass index is 15.02 kg/m².     Physical Exam  Vitals reviewed.   Constitutional:       Comments: Cachectic male sitting in bed in no acute distress.   HENT:      Head: Normocephalic and atraumatic.      Mouth/Throat:      Mouth: Mucous membranes are moist.   Eyes:      Extraocular Movements: Extraocular movements intact.      Conjunctiva/sclera: Conjunctivae normal.      Pupils: Pupils are equal, round, and reactive to light.   Cardiovascular:      Rate and Rhythm: Normal rate and regular rhythm.   Pulmonary:      Comments: Wheezes bilateral lung fields.  Abdominal:      General: Abdomen is flat. Bowel sounds are normal.      Palpations: Abdomen is soft.   Musculoskeletal:         General: Normal range of motion.      Cervical back: Normal range of motion and neck supple.   Skin:     General: Skin is warm.   Neurological:      General: No focal deficit present.      Mental Status: He is alert and oriented to person, place, and time.   Psychiatric:         Mood and Affect: Mood normal.         Behavior: Behavior normal.              CRANIAL NERVES     CN III, IV, VI   Pupils are equal, round, and reactive to light.       Significant Labs: All pertinent labs within the past 24 hours have been reviewed.  Recent Lab Results         11/25/24  0857   11/25/24  0736   11/25/24  0734        Influenza A, Molecular   Negative         Influenza B, Molecular   Negative         Procalcitonin     0.703  Comment: The Procalcitonin test is intended to aid in the risk assessment of   critically ill patients on their first day of ICU admission for   progression   to severe sepsis and septic shock.    A result of <0.50 ng/mL is associated with a low risk of severe   sepsis   and/or septic shock.  This does not exclude localized infection.    A result between 0.50 ng/mL  and 2.0 ng/mL should be interpreted with   consideration of the patient's history and is recommended to retest   within 6   to 24 hours.        A result of >2.0 ng/mL is associated with a high risk of severe   sepsis   and/or septic shock.    Procalcitonin may not be accurate among patients with   localized  infection, recent trauma or major surgery, immunosuppressed   state,  invasive fungal infection, renal dysfunction. Decisions   regarding  initiation or continuation of antibiotic therapy should not be   based  solely on procalcitonin levels.         Albumin     3.8       ALP     63       ALT     14       Anion Gap     5       AST     21       Baso #     0.05       Basophil %     0.2       BILIRUBIN TOTAL     0.8  Comment: For infants and newborns, interpretation of results should be based  on gestational age, weight and in agreement with clinical  observations.    Premature Infant recommended reference ranges:  Up to 24 hours.............<8.0 mg/dL  Up to 48 hours............<12.0 mg/dL  3-5 days..................<15.0 mg/dL  6-29 days.................<15.0 mg/dL         BNP     44  Comment: Values of less than 100 pg/ml are consistent with non-CHF populations.       BUN     19       Calcium     8.6       Chloride     97       CO2     31       Creatinine     0.9       Differential Method     Automated       eGFR     >60.0       Eos #     0.0       Eos %     0.0       Flu A & B Source   Nasal swab         Glucose     126       Gran # (ANC)     18.9       Gran %     91.6       Hematocrit     42.0       Hemoglobin     13.7       Immature Grans (Abs)     0.14  Comment: Mild elevation in immature granulocytes is non specific and   can be seen in a variety of conditions including stress response,   acute inflammation, trauma and pregnancy. Correlation with other   laboratory and clinical findings is essential.         Immature Granulocytes     0.7       Lymph #     0.4       Lymph %     1.7       MCH     30.0        MCHC     32.6       MCV     92       Mono #     1.2       Mono %     5.8       MPV     9.4       nRBC     0       Platelet Count     337       POC Lactate 0.87           Potassium     4.5       PROTEIN TOTAL     6.5       RBC     4.57       RDW     13.8       Sample VENOUS           SARS-CoV-2 RNA, Amplification, Qual   Negative  Comment: This test utilizes isothermal nucleic acid amplification technology   to   detect the SARS-CoV-2 RdRp nucleic acid segment. The analytical   sensitivity   (limit of detection) is 500 copies/swab.     A POSITIVE result is indicative of the presence of SARS-CoV-2 RNA;   clinical   correlation with patient history and other diagnostic information is   necessary to determine patient infection status.    A NEGATIVE result means that SARS-CoV-2 nucleic acids are not present   above   the limit of detection. A NEGATIVE result should be treated as   presumptive.   It does not rule out the possibility of COVID-19 and should not be   the sole   basis for treatment decisions. If COVID-19 is strongly suspected   based on   clinical and exposure history, re-testing using an alternate   molecular assay   should be considered.     This test is FDA approved.Performance characteristics of this test   has been   independently verified by [facility] in conjunction   with   [facility] of Pathology and Laboratory   Medicine.           Sodium     133       Troponin I High Sensitivity     10.6  Comment: Troponin results differ between methods. Do not use   results between Troponin methods interchangeably.    Alkaline Phospatase levels above 400 U/L may   cause false positive results.    Access hsTnI should not be used for patients taking   Asfotase davis (Strensiq).         WBC     20.66               Significant Imaging: I have reviewed all pertinent imaging results/findings within the past 24 hours.

## 2024-11-25 NOTE — ED PROVIDER NOTES
Encounter Date: 11/25/2024       History     Chief Complaint   Patient presents with    Shortness of Breath     Worse today. No home O2.     Chest Pain     Left sided, 5/10, dull     74-year-old male with history of long tobacco use.  Patient states smokes a proximally 1 pack per day has been doing so for greater than 60 years.  Patient presents emergency department with complaint of nonproductive cough, shortness of breath, wheezing chest tightness over last week.  Patient does have a albuterol inhaler states has not been working however.  Decided come to the emergency department today due to worsening shortness of breath.  Denied chest pain, no abdominal pain, no URI symptoms.  Denies any other constitutional symptoms.  Does admit having a nonproductive cough.  On arrival to emergency department patient found with room air sats of 87-88%.  Placed on 2 L nasal cannula improved 93%.      Review of patient's allergies indicates:  No Known Allergies  Past Medical History:   Diagnosis Date    COPD (chronic obstructive pulmonary disease)      Past Surgical History:   Procedure Laterality Date    MOUTH SURGERY  2000     No family history on file.  Social History     Tobacco Use    Smoking status: Every Day     Types: Cigarettes    Smokeless tobacco: Never   Substance Use Topics    Alcohol use: Not Currently    Drug use: Never     Review of Systems   Constitutional:  Negative for fever.   HENT:  Negative for sore throat.    Respiratory:  Positive for cough, chest tightness, shortness of breath and wheezing.    Cardiovascular:  Negative for chest pain.   Gastrointestinal:  Negative for nausea and vomiting.   Genitourinary:  Negative for dysuria.   Musculoskeletal:  Negative for back pain.   Skin:  Negative for rash.   Neurological:  Negative for weakness.   Hematological:  Does not bruise/bleed easily.       Physical Exam     Initial Vitals [11/25/24 0654]   BP Pulse Resp Temp SpO2   127/76 (!) 113 (!) 44 99.3 °F (37.4 °C)  (!) 93 %      MAP       --         Physical Exam    Nursing note and vitals reviewed.  Constitutional: He appears distressed.   Thin-appearing elderly male with conversational dyspnea and accessory muscle use and pursed lip breathing.   HENT:   Head: Normocephalic and atraumatic.   Nose: Nose normal. Mouth/Throat: Oropharynx is clear and moist.   Eyes: Conjunctivae and EOM are normal. Pupils are equal, round, and reactive to light. No scleral icterus.   Neck: Neck supple.   Normal range of motion.  Cardiovascular:  Normal rate, regular rhythm, normal heart sounds and intact distal pulses.     Exam reveals no gallop and no friction rub.       No murmur heard.  Pulmonary/Chest: No stridor. No respiratory distress. He has wheezes.   Diminished breath sounds throughout all lung fields with retractions   Abdominal: Abdomen is soft. Bowel sounds are normal. He exhibits no mass. There is no abdominal tenderness. There is no rebound and no guarding.   Musculoskeletal:         General: No edema. Normal range of motion.      Cervical back: Normal range of motion and neck supple.     Lymphadenopathy:     He has no cervical adenopathy.   Neurological: He is alert and oriented to person, place, and time. He has normal strength and normal reflexes. No cranial nerve deficit or sensory deficit. GCS score is 15. GCS eye subscore is 4. GCS verbal subscore is 5. GCS motor subscore is 6.   Skin: Skin is warm and dry. Capillary refill takes less than 2 seconds. No rash noted.   Psychiatric: He has a normal mood and affect. His behavior is normal. Judgment and thought content normal.         ED Course   Procedures  Labs Reviewed   CBC W/ AUTO DIFFERENTIAL - Abnormal       Result Value    WBC 20.66 (*)     RBC 4.57 (*)     Hemoglobin 13.7 (*)     Hematocrit 42.0      MCV 92      MCH 30.0      MCHC 32.6      RDW 13.8      Platelets 337      MPV 9.4      Immature Granulocytes 0.7 (*)     Gran # (ANC) 18.9 (*)     Immature Grans (Abs) 0.14  (*)     Lymph # 0.4 (*)     Mono # 1.2 (*)     Eos # 0.0      Baso # 0.05      nRBC 0      Gran % 91.6 (*)     Lymph % 1.7 (*)     Mono % 5.8      Eosinophil % 0.0      Basophil % 0.2      Differential Method Automated      Narrative:     Release to patient->Immediate   COMPREHENSIVE METABOLIC PANEL - Abnormal    Sodium 133 (*)     Potassium 4.5      Chloride 97      CO2 31 (*)     Glucose 126 (*)     BUN 19      Creatinine 0.9      Calcium 8.6 (*)     Total Protein 6.5      Albumin 3.8      Total Bilirubin 0.8      Alkaline Phosphatase 63      AST 21      ALT 14      eGFR >60.0      Anion Gap 5 (*)     Narrative:     Release to patient->Immediate   PROCALCITONIN - Abnormal    Procalcitonin 0.703 (*)    CULTURE, BLOOD   CULTURE, BLOOD   B-TYPE NATRIURETIC PEPTIDE    BNP 44      Narrative:     Release to patient->Immediate   INFLUENZA A AND B ANTIGEN    Influenza A, Molecular Negative      Influenza B, Molecular Negative      Flu A & B Source Nasal swab      Narrative:     Specimen Source->Nasopharyngeal Swab   SARS-COV-2 RNA AMPLIFICATION, QUAL    SARS-CoV-2 RNA, Amplification, Qual Negative     PROCALCITONIN   TROPONIN I HIGH SENSITIVITY   TROPONIN I HIGH SENSITIVITY    Troponin I High Sensitivity 10.6     HEPATITIS C ANTIBODY   HIV 1 / 2 ANTIBODY   URINALYSIS, REFLEX TO URINE CULTURE   LACTIC ACID, PLASMA   ISTAT LACTATE    POC Lactate 0.87      Sample VENOUS     POCT LACTATE        ECG Results              EKG 12-lead (In process)        Collection Time Result Time QRS Duration OHS QTC Calculation    11/25/24 07:01:56 11/25/24 08:45:46 90 406                     In process by Interface, Lab In [facility] (11/25/24 08:46:03)                   Narrative:    Test Reason : J44.1,    Vent. Rate : 109 BPM     Atrial Rate : 109 BPM     P-R Int : 128 ms          QRS Dur :  90 ms      QT Int : 302 ms       P-R-T Axes :     92  72 degrees    QTcB Int : 406 ms    Sinus tachycardia  Rightward axis  Septal infarct ,age  undetermined  Abnormal ECG  No previous ECGs available    Referred By: AAAREFERRAL SELF           Confirmed By:                                   Imaging Results              X-Ray Chest AP (Final result)  Result time 11/25/24 07:22:43      Final result by Chato Arauz DO (11/25/24 07:22:43)                   Impression:      1. Hyperinflation of the lungs with prominence of the interstitium is consistent with emphysematous lung disease.  2. Focal hazy opacity of the left lung could reflect an infiltrate.      Electronically signed by: Chato Arauz  Date:    11/25/2024  Time:    07:22               Narrative:    EXAMINATION:  XR CHEST AP PORTABLE    CLINICAL HISTORY:  sob;    FINDINGS:  Portable chest with comparison chest x-ray 05/22/2024.  Normal cardiomediastinal silhouette.Hyperinflation of the lungs with prominence of the interstitium is unchanged.  There is a focal hazy opacity of the left lung base.  Pulmonary vasculature is normal. No acute osseous abnormality.                                       Medications   levalbuterol nebulizer solution 3.75 mg (3.75 mg Nebulization Given 11/25/24 0737)   methylPREDNISolone sodium succinate injection 125 mg (125 mg Intravenous Given 11/25/24 0731)   lactated ringers bolus 1,000 mL (0 mLs Intravenous Stopped 11/25/24 0844)   doxycycline 100 mg in D5W 100 mL IVPB (MB+) (100 mg Intravenous New Bag 11/25/24 0907)   lactated ringers infusion (1,000 mLs Intravenous New Bag 11/25/24 0910)     Medical Decision Making            Attending Attestation:         Attending Critical Care:   Critical Care Times:   Direct Patient Care (initial evaluation, reassessments, and time considering the case)................................................................30 minutes.   Additional History from reviewing old medical records or taking additional history from the family, EMS, PCP, etc.......................5 minutes.   Ordering, Reviewing, and Interpreting Diagnostic  "Studies...............................................................................................................5 minutes.   Documentation..................................................................................................................................................................................5 minutes.   Consultation with other Physicians. .................................................................................................................................................5 minutes.   Consultation with the patient's family directly relating to the patient's condition, care, and DNR status (when patient unable)......5 minutes.   Other..................................................................................................................................................................................................5 minutes.   ==============================================================  Total Critical Care Time - exclusive of procedural time: 60 minutes.  ==============================================================  Critical care was necessary to treat or prevent imminent or life-threatening deterioration of the following conditions: COPD exacerbation.   Critical care was time spent personally by me on the following activities: obtaining history from patient or relative, examination of patient, review of old charts, development of treatment plan with patient or relative, ordering and performing treatments and interventions, ordering lab, x-rays, and/or EKG, review of x-rays / CT sent with the patient, evaluation of patient's response to treatment, discussion with consultants and re-evaluation of patient's conition.   Critical Care Condition: potentially life-threatening           Sepsis Perfusion Assessment: "I attest a sepsis perfusion exam was performed within 6 hours of sepsis, severe sepsis, or septic shock presentation, following fluid resuscitation."        "   Medical Decision Making:   Initial Assessment:   74-year-old male with history of long tobacco use.  Patient states smokes a proximally 1 pack per day has been doing so for greater than 60 years.  Patient presents emergency department with complaint of nonproductive cough, shortness of breath, wheezing chest tightness over last week.  Patient does have a albuterol inhaler states has not been working however.  Decided come to the emergency department today due to worsening shortness of breath.  Denied chest pain, no abdominal pain, no URI symptoms.  Denies any other constitutional symptoms.  Does admit having a nonproductive cough.  On arrival to emergency department patient found with room air sats of 87-88%.  Placed on 2 L nasal cannula improved 93%.    Differential Diagnosis:   COPD exacerbation, CHF, pneumonia, URI,  Clinical Tests:   Lab Tests: Ordered and Reviewed  Radiological Study: Ordered and Reviewed  Medical Tests: Ordered and Reviewed             Clinical Impression:  Final diagnoses:  [R06.02] SOB (shortness of breath)  [J44.1] COPD exacerbation (Primary)  [J18.9] Pneumonia of left lower lobe due to infectious organism          ED Disposition Condition    Admit Stable                James Oliver MD  11/25/24 0716       James Oliver MD  11/25/24 0717       James Oliver MD  11/25/24 1007

## 2024-11-25 NOTE — H&P
[facility] - Emergency Dept  Hospital Medicine  History & Physical    Patient Name: Mejia Sandoval  MRN: [id number]  Patient Class: Emergency  Admission Date: 11/25/2024  Attending Physician: Andie Chen MD  Primary Care Provider: Faye, Primary Doctor         Patient information was obtained from patient and ER records.     Subjective:     Principal Problem:Severe sepsis    Chief Complaint:   Chief Complaint   Patient presents with    Shortness of Breath     Worse today. No home O2.     Chest Pain     Left sided, 5/10, dull        HPI: 75 yo male with PMH of COPD not on home oxygen presented to the ER because of shortness of breath.  Patient has been having progressive shortness on breath over the last week after running out of his inhaler.  Also wheezing, productive cough of white sputum.  He denied any fever or chills.  Denied any sore throat, chest pain. He still actively smokes 1 ppd.    In the ER, vitals showed bp of 127/76, HR of 113, respiratory rate of 44, afebrile satting 93 percent on room air.  CBC showed a white count of 20.  CMP with hyponatremia of 133.  Procalcitonin is elevated at 0.7.  CXR showed emphysematous lung disease, and focal hazy opacity of the left lung could reflect an infiltrate.  EKG shows sinus tachycardia.  Patient was found to be in COPD exacerbation and was given methylprednisolone, and neb treatments.  He was also started on doxycycline for pneumonia, and was given 2 L of lactated ringer for hypotension with blood pressure dropping to the 80s systolically.  Patient's oxygen dropped as low as 89% on room air per the ER provider, and so was placed on 2 L nasal cannula.  He will be admitted to Medicine for COPD exacerbation and pneumonia.        Past Medical History:   Diagnosis Date    COPD (chronic obstructive pulmonary disease)        Past Surgical History:   Procedure Laterality Date    MOUTH SURGERY  2000       Review of patient's allergies indicates:  No Known  Allergies    No current facility-administered medications on file prior to encounter.     Current Outpatient Medications on File Prior to Encounter   Medication Sig    albuterol (PROVENTIL HFA) 90 mcg/actuation inhaler Inhale 2 puffs into the lungs every 6 (six) hours as needed for Wheezing. Rescue    aspirin/salicylamide/caffeine (BC HEADACHE POWDER ORAL) Take 1 packet by mouth as needed.    fluticasone-umeclidin-vilanter (TRELEGY ELLIPTA) 100-62.5-25 mcg DsDv Inhale 1 puff into the lungs once daily.    albuterol-ipratropium (DUO-NEB) 2.5 mg-0.5 mg/3 mL nebulizer solution Take 3 mLs by nebulization every 6 (six) hours as needed for Wheezing. Rescue     Family History    None       Tobacco Use    Smoking status: Every Day     Types: Cigarettes    Smokeless tobacco: Never   Substance and Sexual Activity    Alcohol use: Not Currently    Drug use: Never    Sexual activity: Not on file     Review of Systems   Constitutional:  Negative for chills and fever.   HENT:  Negative for sore throat.    Eyes:  Negative for visual disturbance.   Respiratory:  Positive for cough, shortness of breath and wheezing.    Cardiovascular:  Negative for chest pain and palpitations.   Gastrointestinal:  Negative for abdominal pain, nausea and vomiting.   Endocrine: Negative for polydipsia and polyuria.   Genitourinary:  Negative for dysuria, frequency and urgency.   Skin:  Negative for rash.   Neurological:  Negative for tremors.   Psychiatric/Behavioral:  Negative for confusion.      Objective:     Vital Signs (Most Recent):  Temp: 99.3 °F (37.4 °C) (11/25/24 0654)  Pulse: 86 (11/25/24 0930)  Resp: (!) 53 (11/25/24 0930)  BP: (!) 100/51 (11/25/24 0930)  SpO2: (!) 93 % (11/25/24 0930) Vital Signs (24h Range):  Temp:  [99.3 °F (37.4 °C)] 99.3 °F (37.4 °C)  Pulse:  [] 86  Resp:  [20-53] 53  SpO2:  [90 %-97 %] 93 %  BP: ()/(50-76) 100/51     Weight: 53.1 kg (117 lb)  Body mass index is 15.02 kg/m².     Physical Exam  Vitals  reviewed.   Constitutional:       Comments: Cachectic male sitting in bed in no acute distress.   HENT:      Head: Normocephalic and atraumatic.      Mouth/Throat:      Mouth: Mucous membranes are moist.   Eyes:      Extraocular Movements: Extraocular movements intact.      Conjunctiva/sclera: Conjunctivae normal.      Pupils: Pupils are equal, round, and reactive to light.   Cardiovascular:      Rate and Rhythm: Normal rate and regular rhythm.   Pulmonary:      Comments: Wheezes bilateral lung fields.  Abdominal:      General: Abdomen is flat. Bowel sounds are normal.      Palpations: Abdomen is soft.   Musculoskeletal:         General: Normal range of motion.      Cervical back: Normal range of motion and neck supple.   Skin:     General: Skin is warm.   Neurological:      General: No focal deficit present.      Mental Status: He is alert and oriented to person, place, and time.   Psychiatric:         Mood and Affect: Mood normal.         Behavior: Behavior normal.              CRANIAL NERVES     CN III, IV, VI   Pupils are equal, round, and reactive to light.       Significant Labs: All pertinent labs within the past 24 hours have been reviewed.  Recent Lab Results         11/25/24  0857   11/25/24  0736   11/25/24  0734        Influenza A, Molecular   Negative         Influenza B, Molecular   Negative         Procalcitonin     0.703  Comment: The Procalcitonin test is intended to aid in the risk assessment of   critically ill patients on their first day of ICU admission for   progression   to severe sepsis and septic shock.    A result of <0.50 ng/mL is associated with a low risk of severe   sepsis   and/or septic shock.  This does not exclude localized infection.    A result between 0.50 ng/mL and 2.0 ng/mL should be interpreted with   consideration of the patient's history and is recommended to retest   within 6   to 24 hours.        A result of >2.0 ng/mL is associated with a high risk of severe   sepsis    and/or septic shock.    Procalcitonin may not be accurate among patients with   localized  infection, recent trauma or major surgery, immunosuppressed   state,  invasive fungal infection, renal dysfunction. Decisions   regarding  initiation or continuation of antibiotic therapy should not be   based  solely on procalcitonin levels.         Albumin     3.8       ALP     63       ALT     14       Anion Gap     5       AST     21       Baso #     0.05       Basophil %     0.2       BILIRUBIN TOTAL     0.8  Comment: For infants and newborns, interpretation of results should be based  on gestational age, weight and in agreement with clinical  observations.    Premature Infant recommended reference ranges:  Up to 24 hours.............<8.0 mg/dL  Up to 48 hours............<12.0 mg/dL  3-5 days..................<15.0 mg/dL  6-29 days.................<15.0 mg/dL         BNP     44  Comment: Values of less than 100 pg/ml are consistent with non-CHF populations.       BUN     19       Calcium     8.6       Chloride     97       CO2     31       Creatinine     0.9       Differential Method     Automated       eGFR     >60.0       Eos #     0.0       Eos %     0.0       Flu A & B Source   Nasal swab         Glucose     126       Gran # (ANC)     18.9       Gran %     91.6       Hematocrit     42.0       Hemoglobin     13.7       Immature Grans (Abs)     0.14  Comment: Mild elevation in immature granulocytes is non specific and   can be seen in a variety of conditions including stress response,   acute inflammation, trauma and pregnancy. Correlation with other   laboratory and clinical findings is essential.         Immature Granulocytes     0.7       Lymph #     0.4       Lymph %     1.7       MCH     30.0       MCHC     32.6       MCV     92       Mono #     1.2       Mono %     5.8       MPV     9.4       nRBC     0       Platelet Count     337       POC Lactate 0.87           Potassium     4.5       PROTEIN TOTAL     6.5        RBC     4.57       RDW     13.8       Sample VENOUS           SARS-CoV-2 RNA, Amplification, Qual   Negative  Comment: This test utilizes isothermal nucleic acid amplification technology   to   detect the SARS-CoV-2 RdRp nucleic acid segment. The analytical   sensitivity   (limit of detection) is 500 copies/swab.     A POSITIVE result is indicative of the presence of SARS-CoV-2 RNA;   clinical   correlation with patient history and other diagnostic information is   necessary to determine patient infection status.    A NEGATIVE result means that SARS-CoV-2 nucleic acids are not present   above   the limit of detection. A NEGATIVE result should be treated as   presumptive.   It does not rule out the possibility of COVID-19 and should not be   the sole   basis for treatment decisions. If COVID-19 is strongly suspected   based on   clinical and exposure history, re-testing using an alternate   molecular assay   should be considered.     This test is FDA approved.Performance characteristics of this test   has been   independently verified by [facility] in conjunction   with   [facility] Department of Pathology and Laboratory   Medicine.           Sodium     133       Troponin I High Sensitivity     10.6  Comment: Troponin results differ between methods. Do not use   results between Troponin methods interchangeably.    Alkaline Phospatase levels above 400 U/L may   cause false positive results.    Access hsTnI should not be used for patients taking   Asfotase davis (Strensiq).         WBC     20.66               Significant Imaging: I have reviewed all pertinent imaging results/findings within the past 24 hours.  Assessment/Plan:     * Severe sepsis  This patient does have evidence of infective focus  My overall impression is sepsis.  Source: Respiratory, pneumonia.  Patient was given doxycycline.  I will also add ceftriaxone.  Sputum culture.  Continue oxygen supplement for his  hypoxia.    Latest lactate reviewed-  Recent Labs   Lab 11/25/24 0857   POCLAC 0.87     Organ dysfunction indicated by Acute respiratory failure    Fluid challenge:  Patient was given the sepsis IV bolus protocol for resuscitation.          Pneumonia due to infectious organism  Patient was given doxycycline.  Continue doxycycline add ceftriaxone.  Sputum culture.  Blood cultures collected.  Primary team to follow result.      Microbiology Results (last 7 days)       Procedure Component Value Units Date/Time    Blood culture x two cultures. Draw prior to antibiotics. [[id number]] Collected: 11/25/24 0837    Order Status: Sent Specimen: Blood from Peripheral, Antecubital, Right Updated: 11/25/24 0847    Blood culture x two cultures. Draw prior to antibiotics. [[id number]] Collected: 11/25/24 0840    Order Status: Sent Specimen: Blood from Peripheral, Hand, Right Updated: 11/25/24 0847            Acute hypoxic respiratory failure  Lowest SpO2 was 89% on room air, 2 L nasal cannula was applied.  Continue oxygen support.  Wean as tolerated.  Patient will likely need home oxygen evaluation prior to his discharge.  Suspect 2nd to COPD exacerbation, pneumonia. See plan of care for each for further management.  Will order d-dimer, if elevated, will do CTA chest.   Covid/flu - negative.      COPD with acute exacerbation  Patient received methylprednisolone, and levo albuterol treatment.    I will start DuoNeb q.4 hours while awake, continue with steroid daily.      VTE Risk Mitigation (From admission, onward)      None                            Andie Chen MD  Department of Hospital Medicine  formerly [facility] - Emergency Dept

## 2024-11-25 NOTE — PROGRESS NOTES
Automatic Inhaler to Nebulizer Interchange    fluticasone/umeclidinium/vilanterol (Trelegy) 100 mcg/ 62.5 mcg/ 25 mcg changed to budesonide 0.5 mg twice daily AND ipratropium 0.5 mg every 6 hour scheduled AND arformoterol 15 mcg twice daily per [facility] Automatic Therapeutic Substitutions Protocol.    Please contact pharmacy at extension [phone] with any questions.     Thank you,   Polly White

## 2024-11-26 ENCOUNTER — CLINICAL SUPPORT (OUTPATIENT)
Dept: SMOKING CESSATION | Facility: CLINIC | Age: 74
End: 2024-11-26

## 2024-11-26 DIAGNOSIS — F17.200 NICOTINE DEPENDENCE: Primary | ICD-10-CM

## 2024-11-26 PROBLEM — E44.0 MALNUTRITION OF MODERATE DEGREE: Status: ACTIVE | Noted: 2024-11-26

## 2024-11-26 LAB
ANION GAP SERPL CALC-SCNC: 4 MMOL/L (ref 8–16)
BASOPHILS # BLD AUTO: 0.03 K/UL (ref 0–0.2)
BASOPHILS NFR BLD: 0.2 % (ref 0–1.9)
BUN SERPL-MCNC: 17 MG/DL (ref 8–23)
CALCIUM SERPL-MCNC: 8.3 MG/DL (ref 8.7–10.5)
CHLORIDE SERPL-SCNC: 101 MMOL/L (ref 95–110)
CO2 SERPL-SCNC: 31 MMOL/L (ref 23–29)
CREAT SERPL-MCNC: 0.6 MG/DL (ref 0.5–1.4)
DIFFERENTIAL METHOD BLD: ABNORMAL
EOSINOPHIL # BLD AUTO: 0 K/UL (ref 0–0.5)
EOSINOPHIL NFR BLD: 0.1 % (ref 0–8)
ERYTHROCYTE [DISTWIDTH] IN BLOOD BY AUTOMATED COUNT: 14.5 % (ref 11.5–14.5)
EST. GFR  (NO RACE VARIABLE): >60 ML/MIN/1.73 M^2
GLUCOSE SERPL-MCNC: 171 MG/DL (ref 70–110)
HCT VFR BLD AUTO: 36.1 % (ref 40–54)
HGB BLD-MCNC: 11.1 G/DL (ref 14–18)
IMM GRANULOCYTES # BLD AUTO: 0.15 K/UL (ref 0–0.04)
IMM GRANULOCYTES NFR BLD AUTO: 0.8 % (ref 0–0.5)
LYMPHOCYTES # BLD AUTO: 1 K/UL (ref 1–4.8)
LYMPHOCYTES NFR BLD: 4.9 % (ref 18–48)
MAGNESIUM SERPL-MCNC: 1.7 MG/DL (ref 1.6–2.6)
MCH RBC QN AUTO: 29.7 PG (ref 27–31)
MCHC RBC AUTO-ENTMCNC: 30.7 G/DL (ref 32–36)
MCV RBC AUTO: 97 FL (ref 82–98)
MONOCYTES # BLD AUTO: 1.5 K/UL (ref 0.3–1)
MONOCYTES NFR BLD: 7.8 % (ref 4–15)
NEUTROPHILS # BLD AUTO: 16.8 K/UL (ref 1.8–7.7)
NEUTROPHILS NFR BLD: 86.2 % (ref 38–73)
NRBC BLD-RTO: 0 /100 WBC
PHOSPHATE SERPL-MCNC: 2.1 MG/DL (ref 2.7–4.5)
PLATELET # BLD AUTO: 296 K/UL (ref 150–450)
PMV BLD AUTO: 10 FL (ref 9.2–12.9)
POTASSIUM SERPL-SCNC: 4.2 MMOL/L (ref 3.5–5.1)
RBC # BLD AUTO: 3.74 M/UL (ref 4.6–6.2)
SODIUM SERPL-SCNC: 136 MMOL/L (ref 136–145)
WBC # BLD AUTO: 19.42 K/UL (ref 3.9–12.7)

## 2024-11-26 PROCEDURE — 84100 ASSAY OF PHOSPHORUS: CPT | Performed by: HOSPITALIST

## 2024-11-26 PROCEDURE — 25000242 PHARM REV CODE 250 ALT 637 W/ HCPCS: Performed by: HOSPITALIST

## 2024-11-26 PROCEDURE — 25000003 PHARM REV CODE 250: Performed by: HOSPITALIST

## 2024-11-26 PROCEDURE — S4991 NICOTINE PATCH NONLEGEND: HCPCS | Performed by: HOSPITALIST

## 2024-11-26 PROCEDURE — 99406 BEHAV CHNG SMOKING 3-10 MIN: CPT | Performed by: CLINIC/CENTER

## 2024-11-26 PROCEDURE — 94761 N-INVAS EAR/PLS OXIMETRY MLT: CPT

## 2024-11-26 PROCEDURE — 85025 COMPLETE CBC W/AUTO DIFF WBC: CPT | Performed by: HOSPITALIST

## 2024-11-26 PROCEDURE — 94640 AIRWAY INHALATION TREATMENT: CPT

## 2024-11-26 PROCEDURE — 63600175 PHARM REV CODE 636 W HCPCS: Performed by: HOSPITALIST

## 2024-11-26 PROCEDURE — 36415 COLL VENOUS BLD VENIPUNCTURE: CPT | Performed by: HOSPITALIST

## 2024-11-26 PROCEDURE — 99900031 HC PATIENT EDUCATION (STAT)

## 2024-11-26 PROCEDURE — 80048 BASIC METABOLIC PNL TOTAL CA: CPT | Performed by: HOSPITALIST

## 2024-11-26 PROCEDURE — 12000002 HC ACUTE/MED SURGE SEMI-PRIVATE ROOM

## 2024-11-26 PROCEDURE — 99900035 HC TECH TIME PER 15 MIN (STAT)

## 2024-11-26 PROCEDURE — 83735 ASSAY OF MAGNESIUM: CPT | Performed by: HOSPITALIST

## 2024-11-26 PROCEDURE — 27000221 HC OXYGEN, UP TO 24 HOURS

## 2024-11-26 RX ADMIN — IPRATROPIUM BROMIDE AND ALBUTEROL SULFATE 3 ML: .5; 3 SOLUTION RESPIRATORY (INHALATION) at 02:11

## 2024-11-26 RX ADMIN — ARFORMOTEROL TARTRATE 15 MCG: 15 SOLUTION RESPIRATORY (INHALATION) at 07:11

## 2024-11-26 RX ADMIN — DOXYCYCLINE HYCLATE 100 MG: 100 CAPSULE ORAL at 08:11

## 2024-11-26 RX ADMIN — PREDNISONE 50 MG: 5 TABLET ORAL at 08:11

## 2024-11-26 RX ADMIN — CEFTRIAXONE 1 G: 1 INJECTION, POWDER, FOR SOLUTION INTRAMUSCULAR; INTRAVENOUS at 11:11

## 2024-11-26 RX ADMIN — IPRATROPIUM BROMIDE AND ALBUTEROL SULFATE 3 ML: .5; 3 SOLUTION RESPIRATORY (INHALATION) at 07:11

## 2024-11-26 RX ADMIN — IPRATROPIUM BROMIDE AND ALBUTEROL SULFATE 3 ML: .5; 3 SOLUTION RESPIRATORY (INHALATION) at 11:11

## 2024-11-26 RX ADMIN — SODIUM CHLORIDE: 9 INJECTION, SOLUTION INTRAVENOUS at 07:11

## 2024-11-26 RX ADMIN — BUDESONIDE INHALATION 0.5 MG: 0.5 SUSPENSION RESPIRATORY (INHALATION) at 07:11

## 2024-11-26 RX ADMIN — ENOXAPARIN SODIUM 40 MG: 40 INJECTION SUBCUTANEOUS at 04:11

## 2024-11-26 RX ADMIN — NICOTINE 1 PATCH: 14 PATCH, EXTENDED RELEASE TRANSDERMAL at 08:11

## 2024-11-26 NOTE — PLAN OF CARE
Problem: COPD (Chronic Obstructive Pulmonary Disease)  Goal: Optimal Chronic Illness Coping  Outcome: Progressing  Goal: Optimal Level of Functional [place]  Outcome: Progressing  Goal: Absence of Infection Signs and Symptoms  Outcome: Progressing  Goal: Improved Oral Intake  Outcome: Progressing  Goal: Effective Oxygenation and Ventilation  Outcome: Progressing     Problem: Adult Inpatient Plan of Care  Goal: Plan of Care Review  Outcome: Progressing  Goal: Patient-Specific Goal (Individualized)  Outcome: Progressing  Goal: Absence of Hospital-Acquired Illness or Injury  Outcome: Progressing  Goal: Optimal Comfort and Wellbeing  Outcome: Progressing  Goal: Readiness for Transition of Care  Outcome: Progressing     Problem: Skin Injury Risk Increased  Goal: Skin Health and Integrity  Outcome: Progressing     Problem: Sepsis/Septic Shock  Goal: Optimal Coping  Outcome: Progressing  Goal: Absence of Bleeding  Outcome: Progressing  Goal: Blood Glucose Level Within Targeted Range  Outcome: Progressing  Goal: Absence of Infection Signs and Symptoms  Outcome: Progressing  Goal: Optimal Nutrition Intake  Outcome: Progressing     Problem: Pneumonia  Goal: Fluid Balance  Outcome: Progressing  Goal: Resolution of Infection Signs and Symptoms  Outcome: Progressing  Goal: Effective Oxygenation and Ventilation  Outcome: Progressing

## 2024-11-26 NOTE — SUBJECTIVE & OBJECTIVE
Interval History:  Patient seen and examined.  Remains significantly short of breath.  No acute events.    Review of Systems  Objective:     Vital Signs (Most Recent):  Temp: 97.8 °F (36.6 °C) (11/26/24 1147)  Pulse: 85 (11/26/24 1147)  Resp: 19 (11/26/24 1147)  BP: (!) 169/94 (11/26/24 1147)  SpO2: (!) 92 % (11/26/24 1147) Vital Signs (24h Range):  Temp:  [97.7 °F (36.5 °C)-98.7 °F (37.1 °C)] 97.8 °F (36.6 °C)  Pulse:  [62-89] 85  Resp:  [16-44] 19  SpO2:  [92 %-97 %] 92 %  BP: (101-169)/(54-94) 169/94     Weight: 54.7 kg (120 lb 8 oz)  Body mass index is 15.06 kg/m².    Intake/Output Summary (Last 24 hours) at 11/26/2024 1252  Last data filed at 11/26/2024 1232  Gross per 24 hour   Intake 240 ml   Output 625 ml   Net -385 ml         Physical Exam  Vitals and nursing note reviewed.   Constitutional:       Appearance: He is ill-appearing.      Comments: Thin, frail-appearing  male   Eyes:      Pupils: Pupils are equal, round, and reactive to light.   Neck:      Vascular: No JVD.   Cardiovascular:      Rate and Rhythm: Normal rate and regular rhythm.      Heart sounds: Normal heart sounds.   Pulmonary:      Comments: Barrel chest noted, bilateral diminished breath sounds with increased work of breathing noted.  Accessory muscle usage.  Abdominal:      General: Bowel sounds are normal. There is no distension.      Palpations: Abdomen is soft.      Tenderness: There is no abdominal tenderness.   Musculoskeletal:         General: No deformity.   Lymphadenopathy:      Cervical: No cervical adenopathy.   Skin:     Coloration: Skin is not pale.      Findings: No rash.             Significant Labs: All pertinent labs within the past 24 hours have been reviewed.    Significant Imaging: I have reviewed all pertinent imaging results/findings within the past 24 hours.

## 2024-11-26 NOTE — PLAN OF CARE
[facility]  Initial Discharge Assessment       Primary Care Provider: Faye, Primary Doctor    Admission Diagnosis: COPD exacerbation [J44.1]    Admission Date: 11/25/2024  Expected Discharge Date:     SW met with patient at bedside to complete assessment. Patient confirmed demographics, No PCP, and insurance. Patient wasn't able to confirm a pharmacy, stated his son handles his medications. Patient confirmed DME used at home is a nebulizer. Patient confirmed No HH/HB/Oxygen/Blood thinners.     Patient requested assistance with obtaining a PCP.    Patient confirmed transportation for discharge will be provided by Jesus/Mary [phone].     Transition of Care Barriers: None    Payor: [employer] MEDICARE / Plan: [employer] HMO PPO SPECIAL NEEDS / Product Type: Medicare Advantage /     Extended Emergency Contact Information  Primary Emergency Contact: mary ga  Address: [address]            [address]           [place] [address] [facility]  Home Phone: [phone]  Relation: Son   needed? No    Discharge Plan A: Home with family  Discharge Plan B: Home with family      [id number] - [place] - [address]  [address]  [place] [address]  Phone: [phone] Fax: [phone]    [id number] - [place]  [address] [facility] [address]  [address]  [place] [address]  Phone: [phone] Fax: [phone]    [id number] - [place] - [address] AT [address]  [address] [place] [address]  Phone: [phone] Fax: [phone]    [id number] - [place]  [address] & [facility]  [address]   [place] [address]  Phone: [phone] Fax: [phone]      Initial Assessment (most recent)       Adult Discharge Assessment - 11/26/24 0952          Discharge Assessment    Assessment Type Discharge  Planning Assessment     Confirmed/corrected address, phone number and insurance Yes     Confirmed Demographics Correct on Facesheet     Source of Information patient     Communicated PURA with patient/caregiver Date not available/Unable to determine     People in Home child(nawaf), adult;significant other     Do you expect to return to your current living situation? Yes     Do you have help at home or someone to help you manage your care at home? Yes     Who are your caregiver(s) and their phone number(s)? Nayely [phone]     Current cognitive status: Alert/Oriented     Walking or Climbing Stairs Difficulty no     Dressing/Bathing Difficulty no     Equipment Currently Used at Home nebulizer     Readmission within 30 days? No     Patient currently being followed by outpatient case management? No     Do you currently have service(s) that help you manage your care at home? No     Do you take prescription medications? Yes     Do you have prescription coverage? Yes     Coverage [employer]     Do you have any problems affording any of your prescribed medications? No     Is the patient taking medications as prescribed? yes     Who is going to help you get home at discharge? Nayely [phone]     How do you get to doctors appointments? family or friend will provide     Are you on dialysis? No     Do you take coumadin? No     Discharge Plan A Home with family     Discharge Plan B Home with family     DME Needed Upon Discharge  none     Discharge Plan discussed with: Patient     Transition of Care Barriers None

## 2024-11-26 NOTE — ASSESSMENT & PLAN NOTE
Patient has a diagnosis of pneumonia. The cause of the pneumonia is suspected to be bacterial in etiology but organism is not known. The pneumonia is stable. The patient has the following signs/symptoms of pneumonia: persistent hypoxia . The patient does have a current oxygen requirement and the patient does have a home oxygen requirement. I have reviewed the pertinent imaging. The following cultures have been collected: Blood cultures The culture results are listed below.     Current antimicrobial regimen consists of the antibiotics listed below. Will monitor patient closely and continue current treatment plan unchanged.    Antibiotics (From admission, onward)      Start     Stop Route Frequency Ordered    11/25/24 2100  doxycycline capsule 100 mg         -- Oral Every 12 hours 11/25/24 1103    11/25/24 1115  cefTRIAXone injection 1 g         -- IV Every 24 hours (non-standard times) 11/25/24 1106            Microbiology Results (last 7 days)       Procedure Component Value Units Date/Time    Blood culture x two cultures. Draw prior to antibiotics. [[id number]] Collected: 11/25/24 0837    Order Status: Completed Specimen: Blood from Peripheral, Antecubital, Right Updated: 11/26/24 1032     Blood Culture, Routine No Growth to date      No Growth to date    Narrative:      Aerobic and anaerobic    Blood culture x two cultures. Draw prior to antibiotics. [[id number]] Collected: 11/25/24 0840    Order Status: Completed Specimen: Blood from Peripheral, Hand, Right Updated: 11/26/24 1032     Blood Culture, Routine No Growth to date      No Growth to date    Narrative:      Aerobic and anaerobic    Culture, Respiratory with Gram Stain [[id number]]     Order Status: No result Specimen: Respiratory

## 2024-11-26 NOTE — ASSESSMENT & PLAN NOTE
Nutrition consulted. Most recent weight and BMI monitored-     Measurements:  Wt Readings from Last 1 Encounters:   11/26/24 54.7 kg (120 lb 8 oz)   Body mass index is 15.06 kg/m².    Patient has been screened and assessed by RD.    Malnutrition Type:  Context:    Level:      Malnutrition Characteristic Summary:       Interventions/Recommendations (treatment strategy):

## 2024-11-26 NOTE — NURSING
Patient discharged home per orders, all instructions reviewed with patient and patient verbalized understanding.  IV discontinued x 1, Tele box discontinued x 1.  Patient aware to  new medications from the pharmacy. Patient ambulated out of facility with staff by his side, all belongings by patients side.  Patient's family to transport him home.

## 2024-11-26 NOTE — ASSESSMENT & PLAN NOTE
Patient with Hypoxic Respiratory failure which is Acute on chronic.  he is on home oxygen at 2 LPM. Supplemental oxygen was provided and noted-  3LPM    .   Signs/symptoms of respiratory failure include- tachypnea, increased work of breathing, and use of accessory muscles. Contributing diagnoses includes - COPD and Pneumonia Labs and images were reviewed. Patient Has recent ABG, which has been reviewed. Will treat underlying causes and adjust management of respiratory failure for individual disease process as identified below

## 2024-11-26 NOTE — PLAN OF CARE
Problem: COPD (Chronic Obstructive Pulmonary Disease)  Goal: Optimal Chronic Illness Coping  Outcome: Progressing  Goal: Optimal Level of Functional [place]  Outcome: Progressing  Goal: Absence of Infection Signs and Symptoms  Outcome: Progressing  Goal: Improved Oral Intake  Outcome: Progressing  Goal: Effective Oxygenation and Ventilation  Outcome: Progressing     Problem: Adult Inpatient Plan of Care  Goal: Plan of Care Review  Outcome: Progressing  Goal: Patient-Specific Goal (Individualized)  Outcome: Progressing  Goal: Absence of Hospital-Acquired Illness or Injury  Outcome: Progressing  Goal: Optimal Comfort and Wellbeing  Outcome: Progressing  Goal: Readiness for Transition of Care  Outcome: Progressing     Problem: Skin Injury Risk Increased  Goal: Skin Health and Integrity  Outcome: Progressing     Problem: Sepsis/Septic Shock  Goal: Optimal Coping  Outcome: Progressing  Goal: Absence of Bleeding  Outcome: Progressing  Goal: Blood Glucose Level Within Targeted Range  Outcome: Progressing  Goal: Absence of Infection Signs and Symptoms  Outcome: Progressing  Goal: Optimal Nutrition Intake  Outcome: Progressing     Problem: Pneumonia  Goal: Fluid Balance  Outcome: Progressing  Goal: Resolution of Infection Signs and Symptoms  Outcome: Progressing  Goal: Effective Oxygenation and Ventilation  Outcome: Progressing

## 2024-11-26 NOTE — NURSING
Patient transferred to unit via wheelchair, patient ambulated into room x 2, patient assisted to bed, lunch at bedside, patient placed on 02 @ 3L at 92%, call light within easy reach, SR up x 2, bed locked in lowest position, bed alarm on and sounded when prompted.

## 2024-11-26 NOTE — CARE UPDATE
11/25/24 1954   Patient Assessment/Suction   Level of Consciousness (AVPU) alert   Respiratory Effort Normal;Unlabored   Expansion/Accessory Muscles/Retractions no use of accessory muscles   All Lung Fields Breath Sounds equal bilaterally;coarse   Rhythm/Pattern, Respiratory unlabored   Cough Frequency frequent   Cough Type fair;loose;congested;nonproductive   PRE-TX-O2   Device (Oxygen Therapy) nasal cannula   Flow (L/min) (Oxygen Therapy) 3   SpO2 (!) 94 %   Pulse Oximetry Type Intermittent   $ Pulse Oximetry - Multiple Charge Pulse Oximetry - Multiple   Pulse 79   Aerosol Therapy   $ Aerosol Therapy Charges Aerosol Treatment  (Duoneb)   Daily Review of Necessity (SVN) completed   Respiratory Treatment Status (SVN) given   Treatment Route (SVN) mask   Patient Position HOB elevated   Post Treatment Assessment (SVN) breath sounds unchanged   Signs of Intolerance (SVN) none   Breath Sounds Post-Respiratory Treatment   Throughout All Fields Post-Treatment All Fields   Throughout All Fields Post-Treatment aeration increased   Post-treatment Heart Rate (beats/min) 80   Post-treatment Resp Rate (breaths/min) 20   Education   $ Education Bronchodilator;15 min

## 2024-11-26 NOTE — PROGRESS NOTES
[facility] Medicine  Progress Note    Patient Name: Mejia Sandoval  MRN: [id number]  Patient Class: IP- Inpatient   Admission Date: 11/25/2024  Length of Stay: 1 days  Attending Physician: Trevor Sanchez MD  Primary Care Provider: Faye, Primary Doctor        Subjective:     Principal Problem:Acute hypoxic respiratory failure        HPI:  73 yo male with PMH of COPD not on home oxygen presented to the ER because of shortness of breath.  Patient has been having progressive shortness on breath over the last week after running out of his inhaler.  Also wheezing, productive cough of white sputum.  He denied any fever or chills.  Denied any sore throat, chest pain. He still actively smokes 1 ppd.    In the ER, vitals showed bp of 127/76, HR of 113, respiratory rate of 44, afebrile satting 93 percent on room air.  CBC showed a white count of 20.  CMP with hyponatremia of 133.  Procalcitonin is elevated at 0.7.  CXR showed emphysematous lung disease, and focal hazy opacity of the left lung could reflect an infiltrate.  EKG shows sinus tachycardia.  Patient was found to be in COPD exacerbation and was given methylprednisolone, and neb treatments.  He was also started on doxycycline for pneumonia, and was given 2 L of lactated ringer for hypotension with blood pressure dropping to the 80s systolically.  Patient's oxygen dropped as low as 89% on room air per the ER provider, and so was placed on 2 L nasal cannula.  He will be admitted to Medicine for COPD exacerbation and pneumonia.        Overview/Hospital Course:  No notes on file    Interval History:  Patient seen and examined.  Remains significantly short of breath.  No acute events.    Review of Systems  Objective:     Vital Signs (Most Recent):  Temp: 97.8 °F (36.6 °C) (11/26/24 1147)  Pulse: 85 (11/26/24 1147)  Resp: 19 (11/26/24 1147)  BP: (!) 169/94 (11/26/24 1147)  SpO2: (!) 92 % (11/26/24 1147) Vital Signs (24h Range):  Temp:  [97.7 °F (36.5  °C)-98.7 °F (37.1 °C)] 97.8 °F (36.6 °C)  Pulse:  [62-89] 85  Resp:  [16-44] 19  SpO2:  [92 %-97 %] 92 %  BP: (101-169)/(54-94) 169/94     Weight: 54.7 kg (120 lb 8 oz)  Body mass index is 15.06 kg/m².    Intake/Output Summary (Last 24 hours) at 11/26/2024 1252  Last data filed at 11/26/2024 1232  Gross per 24 hour   Intake 240 ml   Output 625 ml   Net -385 ml         Physical Exam  Vitals and nursing note reviewed.   Constitutional:       Appearance: He is ill-appearing.      Comments: Thin, frail-appearing  male   Eyes:      Pupils: Pupils are equal, round, and reactive to light.   Neck:      Vascular: No JVD.   Cardiovascular:      Rate and Rhythm: Normal rate and regular rhythm.      Heart sounds: Normal heart sounds.   Pulmonary:      Comments: Barrel chest noted, bilateral diminished breath sounds with increased work of breathing noted.  Accessory muscle usage.  Abdominal:      General: Bowel sounds are normal. There is no distension.      Palpations: Abdomen is soft.      Tenderness: There is no abdominal tenderness.   Musculoskeletal:         General: No deformity.   Lymphadenopathy:      Cervical: No cervical adenopathy.   Skin:     Coloration: Skin is not pale.      Findings: No rash.             Significant Labs: All pertinent labs within the past 24 hours have been reviewed.    Significant Imaging: I have reviewed all pertinent imaging results/findings within the past 24 hours.    Assessment/Plan:      * Acute hypoxic respiratory failure  Patient with Hypoxic Respiratory failure which is Acute on chronic.  he is on home oxygen at 2 LPM. Supplemental oxygen was provided and noted-  3LPM    .   Signs/symptoms of respiratory failure include- tachypnea, increased work of breathing, and use of accessory muscles. Contributing diagnoses includes - COPD and Pneumonia Labs and images were reviewed. Patient Has recent ABG, which has been reviewed. Will treat underlying causes and adjust management of  respiratory failure for individual disease process as identified below        Malnutrition of moderate degree  Nutrition consulted. Most recent weight and BMI monitored-     Measurements:  Wt Readings from Last 1 Encounters:   11/26/24 54.7 kg (120 lb 8 oz)   Body mass index is 15.06 kg/m².    Patient has been screened and assessed by RD.    Malnutrition Type:  Context:    Level:      Malnutrition Characteristic Summary:       Interventions/Recommendations (treatment strategy):         Tobacco dependency  Dangers of cigarette smoking were reviewed with patient in detail. Patient was Counseled for 3-10 minutes. Nicotine replacement options were discussed. Nicotine replacement was discussed- prescribed    COPD with acute exacerbation  Patient's COPD is with exacerbation noted by continued dyspnea, use of accessory muscles for breathing, and worsening of baseline hypoxia currently.  Patient is currently on COPD Pathway. Continue scheduled inhalers Steroids, Antibiotics, and Supplemental Oxygen and monitor respiratory status closely.       Pneumonia due to infectious organism  Patient has a diagnosis of pneumonia. The cause of the pneumonia is suspected to be bacterial in etiology but organism is not known. The pneumonia is stable. The patient has the following signs/symptoms of pneumonia: persistent hypoxia . The patient does have a current oxygen requirement and the patient does have a home oxygen requirement. I have reviewed the pertinent imaging. The following cultures have been collected: Blood cultures The culture results are listed below.     Current antimicrobial regimen consists of the antibiotics listed below. Will monitor patient closely and continue current treatment plan unchanged.    Antibiotics (From admission, onward)      Start     Stop Route Frequency Ordered    11/25/24 2100  doxycycline capsule 100 mg         -- Oral Every 12 hours 11/25/24 1103    11/25/24 1115  cefTRIAXone injection 1 g         --  IV Every 24 hours (non-standard times) 11/25/24 1106            Microbiology Results (last 7 days)       Procedure Component Value Units Date/Time    Blood culture x two cultures. Draw prior to antibiotics. [[id number]] Collected: 11/25/24 0837    Order Status: Completed Specimen: Blood from Peripheral, Antecubital, Right Updated: 11/26/24 1032     Blood Culture, Routine No Growth to date      No Growth to date    Narrative:      Aerobic and anaerobic    Blood culture x two cultures. Draw prior to antibiotics. [[id number]] Collected: 11/25/24 0840    Order Status: Completed Specimen: Blood from Peripheral, Hand, Right Updated: 11/26/24 1032     Blood Culture, Routine No Growth to date      No Growth to date    Narrative:      Aerobic and anaerobic    Culture, Respiratory with Gram Stain [[id number]]     Order Status: No result Specimen: Respiratory                 VTE Risk Mitigation (From admission, onward)           Ordered     enoxaparin injection 40 mg  Daily         11/25/24 1106     IP VTE HIGH RISK PATIENT  Once         11/25/24 1106     Place sequential compression device  Until discontinued         11/25/24 1106                    Discharge Planning   PURA: 11/28/2024     Code Status: Full Code   Is the patient medically ready for discharge?:     Reason for patient still in hospital (select all that apply): Treatment  Discharge Plan A: Home with family                  Trevor Sanchez MD  Department of Hospital Medicine   [facility]

## 2024-11-26 NOTE — ASSESSMENT & PLAN NOTE
Patient's COPD is with exacerbation noted by continued dyspnea, use of accessory muscles for breathing, and worsening of baseline hypoxia currently.  Patient is currently on COPD Pathway. Continue scheduled inhalers Steroids, Antibiotics, and Supplemental Oxygen and monitor respiratory status closely.

## 2024-11-26 NOTE — PROGRESS NOTES
11/26/24 1200   Tobacco Cessation Intervention   Do you use any type of tobacco product? Yes   Are you interested in quitting use of tobacco products? Thinking about quitting   Are you interested in Nicotine Replacement for symptom relief? Yes   $ Smoking Cessation Charges Smoking Cessation - Intermediate (CTTS)

## 2024-11-27 ENCOUNTER — PATIENT MESSAGE (OUTPATIENT)
Dept: CASE MANAGEMENT | Facility: HOSPITAL | Age: 74
End: 2024-11-27

## 2024-11-27 VITALS
TEMPERATURE: 98 F | HEART RATE: 75 BPM | OXYGEN SATURATION: 96 % | DIASTOLIC BLOOD PRESSURE: 74 MMHG | RESPIRATION RATE: 15 BRPM | BODY MASS INDEX: 14.99 KG/M2 | SYSTOLIC BLOOD PRESSURE: 143 MMHG | WEIGHT: 120.56 LBS | HEIGHT: 75 IN

## 2024-11-27 PROBLEM — E43 SEVERE MALNUTRITION: Status: ACTIVE | Noted: 2024-11-27

## 2024-11-27 LAB
ANION GAP SERPL CALC-SCNC: 3 MMOL/L (ref 8–16)
BASOPHILS # BLD AUTO: 0.03 K/UL (ref 0–0.2)
BASOPHILS NFR BLD: 0.2 % (ref 0–1.9)
BUN SERPL-MCNC: 19 MG/DL (ref 8–23)
CALCIUM SERPL-MCNC: 8.6 MG/DL (ref 8.7–10.5)
CHLORIDE SERPL-SCNC: 101 MMOL/L (ref 95–110)
CO2 SERPL-SCNC: 31 MMOL/L (ref 23–29)
CREAT SERPL-MCNC: 0.5 MG/DL (ref 0.5–1.4)
DIFFERENTIAL METHOD BLD: ABNORMAL
EOSINOPHIL # BLD AUTO: 0 K/UL (ref 0–0.5)
EOSINOPHIL NFR BLD: 0 % (ref 0–8)
ERYTHROCYTE [DISTWIDTH] IN BLOOD BY AUTOMATED COUNT: 14.6 % (ref 11.5–14.5)
EST. GFR  (NO RACE VARIABLE): >60 ML/MIN/1.73 M^2
GLUCOSE SERPL-MCNC: 97 MG/DL (ref 70–110)
HCT VFR BLD AUTO: 36.1 % (ref 40–54)
HGB BLD-MCNC: 11.4 G/DL (ref 14–18)
IMM GRANULOCYTES # BLD AUTO: 0.09 K/UL (ref 0–0.04)
IMM GRANULOCYTES NFR BLD AUTO: 0.5 % (ref 0–0.5)
LYMPHOCYTES # BLD AUTO: 1.2 K/UL (ref 1–4.8)
LYMPHOCYTES NFR BLD: 6.8 % (ref 18–48)
MAGNESIUM SERPL-MCNC: 1.7 MG/DL (ref 1.6–2.6)
MCH RBC QN AUTO: 30.1 PG (ref 27–31)
MCHC RBC AUTO-ENTMCNC: 31.6 G/DL (ref 32–36)
MCV RBC AUTO: 95 FL (ref 82–98)
MONOCYTES # BLD AUTO: 1.6 K/UL (ref 0.3–1)
MONOCYTES NFR BLD: 8.9 % (ref 4–15)
NEUTROPHILS # BLD AUTO: 14.9 K/UL (ref 1.8–7.7)
NEUTROPHILS NFR BLD: 83.6 % (ref 38–73)
NRBC BLD-RTO: 0 /100 WBC
PHOSPHATE SERPL-MCNC: 2 MG/DL (ref 2.7–4.5)
PLATELET # BLD AUTO: 298 K/UL (ref 150–450)
PMV BLD AUTO: 9.8 FL (ref 9.2–12.9)
POTASSIUM SERPL-SCNC: 4.1 MMOL/L (ref 3.5–5.1)
RBC # BLD AUTO: 3.79 M/UL (ref 4.6–6.2)
SODIUM SERPL-SCNC: 135 MMOL/L (ref 136–145)
WBC # BLD AUTO: 17.86 K/UL (ref 3.9–12.7)

## 2024-11-27 PROCEDURE — 63600175 PHARM REV CODE 636 W HCPCS: Performed by: HOSPITALIST

## 2024-11-27 PROCEDURE — 25000242 PHARM REV CODE 250 ALT 637 W/ HCPCS: Performed by: HOSPITALIST

## 2024-11-27 PROCEDURE — 94761 N-INVAS EAR/PLS OXIMETRY MLT: CPT

## 2024-11-27 PROCEDURE — 85025 COMPLETE CBC W/AUTO DIFF WBC: CPT | Performed by: HOSPITALIST

## 2024-11-27 PROCEDURE — 25000003 PHARM REV CODE 250: Performed by: HOSPITALIST

## 2024-11-27 PROCEDURE — 27000221 HC OXYGEN, UP TO 24 HOURS

## 2024-11-27 PROCEDURE — 97161 PT EVAL LOW COMPLEX 20 MIN: CPT

## 2024-11-27 PROCEDURE — 94618 PULMONARY STRESS TESTING: CPT

## 2024-11-27 PROCEDURE — 99900035 HC TECH TIME PER 15 MIN (STAT)

## 2024-11-27 PROCEDURE — 80048 BASIC METABOLIC PNL TOTAL CA: CPT | Performed by: HOSPITALIST

## 2024-11-27 PROCEDURE — 36415 COLL VENOUS BLD VENIPUNCTURE: CPT | Performed by: HOSPITALIST

## 2024-11-27 PROCEDURE — S4991 NICOTINE PATCH NONLEGEND: HCPCS | Performed by: HOSPITALIST

## 2024-11-27 PROCEDURE — 94640 AIRWAY INHALATION TREATMENT: CPT

## 2024-11-27 PROCEDURE — 83735 ASSAY OF MAGNESIUM: CPT | Performed by: HOSPITALIST

## 2024-11-27 PROCEDURE — 84100 ASSAY OF PHOSPHORUS: CPT | Performed by: HOSPITALIST

## 2024-11-27 RX ORDER — DOXYCYCLINE 100 MG/1
100 CAPSULE ORAL EVERY 12 HOURS
Qty: 10 CAPSULE | Refills: 0 | Status: SHIPPED | OUTPATIENT
Start: 2024-11-27 | End: 2024-12-02

## 2024-11-27 RX ORDER — PREDNISONE 50 MG/1
50 TABLET ORAL DAILY
Qty: 5 TABLET | Refills: 0 | Status: SHIPPED | OUTPATIENT
Start: 2024-11-28 | End: 2024-12-03

## 2024-11-27 RX ORDER — IPRATROPIUM BROMIDE AND ALBUTEROL SULFATE 2.5; .5 MG/3ML; MG/3ML
3 SOLUTION RESPIRATORY (INHALATION)
Status: DISCONTINUED | OUTPATIENT
Start: 2024-11-27 | End: 2024-11-27 | Stop reason: HOSPADM

## 2024-11-27 RX ADMIN — NICOTINE 1 PATCH: 14 PATCH, EXTENDED RELEASE TRANSDERMAL at 07:11

## 2024-11-27 RX ADMIN — CEFTRIAXONE 1 G: 1 INJECTION, POWDER, FOR SOLUTION INTRAMUSCULAR; INTRAVENOUS at 12:11

## 2024-11-27 RX ADMIN — IPRATROPIUM BROMIDE AND ALBUTEROL SULFATE 3 ML: .5; 3 SOLUTION RESPIRATORY (INHALATION) at 01:11

## 2024-11-27 RX ADMIN — ARFORMOTEROL TARTRATE 15 MCG: 15 SOLUTION RESPIRATORY (INHALATION) at 06:11

## 2024-11-27 RX ADMIN — BUDESONIDE INHALATION 0.5 MG: 0.5 SUSPENSION RESPIRATORY (INHALATION) at 06:11

## 2024-11-27 RX ADMIN — DOXYCYCLINE HYCLATE 100 MG: 100 CAPSULE ORAL at 07:11

## 2024-11-27 RX ADMIN — IPRATROPIUM BROMIDE AND ALBUTEROL SULFATE 3 ML: .5; 3 SOLUTION RESPIRATORY (INHALATION) at 06:11

## 2024-11-27 RX ADMIN — PREDNISONE 50 MG: 5 TABLET ORAL at 07:11

## 2024-11-27 NOTE — PROGRESS NOTES
[facility]  Adult Nutrition   Consult Note (Initial Assessment)     SUMMARY     Recommendations  1. Continue regular diet.    2. Add ONS Ensure Plus TID, preferably strawberry.   3. Replete electrolytes as needed.   4. Consider appetite stimulant    Goals: Meet <50 of EEN and consume <50 of ONS by RD follow up.  Nutrition Goal Status: progressing towards goal    Nutrition Goals: PO intake will meet >50% of estimated needs by RD follow up  and Oral supplement consumption of >50% by RD follow up    Nutrition Interventions: General healthful diet    Nutrition Diagnosis PES Statement:   Malnutrition Type:  Context: chronic illness  Level: severe     Related to (etiology):   Inadequate energy protein intake to meet estimated needs     Signs and Symptoms (as evidenced by):   Malnutrition Characteristic Summary:  Weight Loss (Malnutrition): 10% in 6 months  Energy Intake (Malnutrition): less than 75% for greater than or equal to 3 months  Subcutaneous Fat (Malnutrition): severe depletion  Muscle Mass (Malnutrition): severe depletion  Dietitian Rounds Brief  Consult for malnutrition. Pt. presented 11/25 with COPD exacerbation and pneumonia. Complaint of SOB x 1 week. Pt. states PO intake/appetite is up and down but okay currently. Does not drink ONS at home. States he doesn't have an interest in foods and has a poor appetite. However, he eats 2-3 meals at home. Pt agreed to trying supplement while admitted. Weight reviewed in EPIC chart. 14% wt. loss (19 lbs) x 6 months. Pt states he has not noticed any weight loss recently. States UBW was 160-170 lbs about 1-2 years ago. No questions or concerns at the moment.  Tolerance: tolerating    Nutrition Related Social Determinants of Health: SDOH: None Identified    Malnutrition Assessment  Malnutrition Context: chronic illness  Malnutrition Level: severe          Weight Loss (Malnutrition): 10% in 6 months  Energy Intake (Malnutrition): less than 75% for greater  "than or equal to 3 months  Subcutaneous Fat (Malnutrition): severe depletion  Muscle Mass (Malnutrition): severe depletion   Orbital Region (Subcutaneous Fat Loss): moderate depletion  Upper Arm Region (Subcutaneous Fat Loss): moderate depletion   Orthodox Region (Muscle Loss): severe depletion  Clavicle Bone Region (Muscle Loss): moderate depletion  Clavicle and Acromion Bone Region (Muscle Loss): severe depletion  Dorsal Hand (Muscle Loss): moderate depletion     Diet order:   Current Diet Order: Regular     Evaluation of Received Nutrient/Fluid Intake  Energy Calories Required: not meeting needs  Protein Required: not meeting needs  Fluid Required: meeting needs      % Intake of Estimated Energy Needs: 50%  % Meal Intake: 50 - 75 %      Intake/Output Summary (Last 24 hours) at 11/27/2024 1419  Last data filed at 11/27/2024 1311  Gross per 24 hour   Intake 1320 ml   Output 825 ml   Net 495 ml        Anthropometrics  Temp: 98.1 °F (36.7 °C)  Height Method: Stated  Height: 6' 3" (190.5 cm)  Height (inches): 75 in  Weight Method: Standard Scale  Weight: 54.7 kg (120 lb 9.5 oz)  Weight (lb): 120.59 lb  Ideal Body Weight (IBW), Male: 196 lb  % Ideal Body Weight, Male (lb): 61.53 %  BMI (Calculated): 15.1  BMI Grade: less than 16 protein-energy malnutrition grade III       Estimated/Assessed Needs  Weight Used For Calorie Calculations: 54.7 kg (120 lb 9.5 oz)  Energy Calorie Requirements (kcal): 0043-2329 kcal/day (35-40 kcals/kg)  Energy Need Method: Kcal/kg  Protein Requirements: 55-82 g pro (1-1.6 g/kg)  Weight Used For Protein Calculations: 54.7 kg (120 lb 9.5 oz)  Fluid Requirements (mL): 1 ml/kcal  Estimated Fluid Requirement Method: RDA Method  RDA Method (mL): 1915       Reason for Assessment  Reason For Assessment: consult  Diagnosis: other (see comments) (Acute hypoxic respiratory failure)  Nutrition Discharge Planning: Regular diet    Final diagnoses:  [R06.02] SOB (shortness of breath)  [J44.1] COPD " exacerbation (Primary)  [J18.9] Pneumonia of left lower lobe due to infectious organism     Past Medical History:   Diagnosis Date    COPD (chronic obstructive pulmonary disease)         Nutrition/Diet History  Patient Reported Diet/Restrictions/Preferences: general  Spiritual, Cultural Beliefs, Protestant Practices, Values that Affect Care: no  Food Allergies: NKFA  Factors Affecting Nutritional Intake: decreased appetite, other (see comments) (COPD)    Nutrition Risk Screen  Nutrition Risk Screen: reduced oral intake over the last month, unintentional loss of 10 lbs or more in the past 2 months     MST Score: 0  Have you recently lost weight without trying?: No  Weight loss score: 0  Have you been eating poorly because of a decreased appetite?: No  Appetite score: 0       Weight History:  Wt Readings from Last 10 Encounters:   11/27/24 54.7 kg (120 lb 9.5 oz)   08/09/24 55.8 kg (123 lb)   05/22/24 63.5 kg (140 lb)   02/17/23 60.9 kg (134 lb 4.2 oz)   07/26/21 62.1 kg (136 lb 14.5 oz)   07/06/21 77.1 kg (170 lb)   12/10/19 71.2 kg (156 lb 15.5 oz)        Lab/Procedures/Meds: Pertinent Labs/Meds Reviewed    Medications:Pertinent Medications Reviewed  Scheduled Meds:   albuterol-ipratropium  3 mL Nebulization Q6H WAKE    arformoteroL  15 mcg Nebulization BID    budesonide  0.5 mg Nebulization Q12H    cefTRIAXone (Rocephin) IV (PEDS and ADULTS)  1 g Intravenous Q24H    doxycycline  100 mg Oral Q12H    enoxparin  40 mg Subcutaneous Daily    nicotine  1 patch Transdermal Daily    predniSONE  50 mg Oral Daily     Continuous Infusions:  PRN Meds:.  Current Facility-Administered Medications:     sodium chloride 0.9%, 3 mL, Intravenous, PRN    Labs: Pertinent Labs Reviewed  Clinical Chemistry:  Recent Labs   Lab 11/25/24  0734 11/25/24  0734 11/26/24  0940 11/27/24  0311   *  --  136 135*   K 4.5  --  4.2 4.1   CL 97  --  101 101   CO2 31*  --  31* 31*   *  --  171* 97   BUN 19  --  17 19   CREATININE 0.9  --   0.6 0.5   CALCIUM 8.6*  --  8.3* 8.6*   PROT 6.5  --   --   --    ALBUMIN 3.8  --   --   --    BILITOT 0.8  --   --   --    ALKPHOS 63  --   --   --    AST 21  --   --   --    ALT 14  --   --   --    ANIONGAP 5*  --  4* 3*   MG  --    < > 1.7 1.7   PHOS  --   --  2.1* 2.0*    < > = values in this interval not displayed.       Monitor and Evaluation  Food and Nutrient Intake: energy intake, food and beverage intake  Food and Nutrient Adminstration: diet order  Knowledge/Beliefs/Attitudes: food and nutrition knowledge/skill, beliefs and attitudes  Physical Activity and Function: nutrition-related ADLs and IADLs  Anthropometric Measurements: weight, weight change  Biochemical Data, Medical Tests and Procedures: electrolyte and renal panel, gastrointestinal profile, glucose/endocrine profile, inflammatory profile, lipid profile  Nutrition-Focused Physical Findings: overall appearance, extremities, muscles and bones, head and eyes, skin     Nutrition Risk  Level of Risk/Frequency of Follow-up: moderate     Nutrition Follow-Up  RD Follow-up?: Yes    Jazmyn Da Silva, Dietetic Intern

## 2024-11-27 NOTE — NURSING
Patient discharged home via wheelchair with home o2. He is accompanied by his son and he has all belongings. No distressed noted.

## 2024-11-27 NOTE — PLAN OF CARE
New order for home o2 received and sent to [facility] for review.       11/27/24 1125   Post-Acute Status   Post-Acute Authorization E   E Status Referrals Sent

## 2024-11-27 NOTE — PT/OT/SLP EVAL
Physical Therapy Evaluation and Discharge Note    Patient Name:  Mejia Sandoval   MRN:  [id number]    Recommendations:     Discharge Recommendations: No Therapy Indicated  Discharge Equipment Recommendations: none   Barriers to discharge: None    Assessment:     Mejia Sandoval is a 74 y.o. male admitted with a medical diagnosis of Acute hypoxic respiratory failure. .  At this time, patient is functioning at their prior level of function and does not require further acute PT services.  Pt  to be D/c'ed home on  02. Sa02 on RA at rest 90% . Required 3 L to keep 02 sat greater than 90% with gait in the groves.    Recent Surgery: * No surgery found *      Plan:     During this hospitalization, patient does not require further acute PT services.  Please re-consult if situation changes.      Subjective     Chief Complaint: Shortness of breath  Patient/Family Comments/goals: Return home with son's support  Pain/Comfort:       Patients cultural, spiritual, Rastafarian conflicts given the current situation:      Living Environment:  Pt lives on a boat house with his SO who is presently a pt here at [facility].  He will stay with his son upon DC.  Prior to admission, patients level of function was independent .  Equipment used at home: nebulizer.  DME owned (not currently used): none.  Upon discharge, patient will have assistance from his son.    Objective:     Communicated with nurse prior to session.  Patient found supine with   upon PT entry to room.    General Precautions: Standard, fall    Orthopedic Precautions:    Braces:    Respiratory Status: Nasal cannula, flow 3 L/min    Exams:  Cognitive Exam:  Patient is oriented to Person, Place, Time, and Situation  RLE ROM: WFL  RLE Strength: WFL  LLE ROM: WFL  LLE Strength: WFL    Functional Mobility:  Bed Mobility:     Supine to Sit: stand by assistance  Sit to Supine: stand by assistance  Transfers:     Sit to Stand:  stand by assistance with no AD  Gait: 60 ft SBA no AD     AM-PAC 6 CLICK  MOBILITY  Total Score:        Treatment and Education:  PT eval  and DC.      AM-PAC 6 CLICK MOBILITY  Total Score:      Patient left supine with all lines intact, call button in reach, bed alarm on, and nurse notified.    GOALS:   Multidisciplinary Problems       Physical Therapy Goals       Not on file                    History:     Past Medical History:   Diagnosis Date    COPD (chronic obstructive pulmonary disease)        Past Surgical History:   Procedure Laterality Date    MOUTH SURGERY  2000       Time Tracking:     PT Received On: 11/27/24  PT Start Time: 1500     PT Stop Time: 1511  PT Total Time (min): 11 min     Billable Minutes: Evaluation 11 minutes      11/27/2024

## 2024-11-27 NOTE — PLAN OF CARE
Spoke with Magdalena from [facility], patient approved for home o2 and portable tanks will be delivered to the bedside today.       11/27/24 1419   Post-Acute Status   Post-Acute Authorization HME   HME Status Set-up Complete/Auth obtained

## 2024-11-27 NOTE — ASSESSMENT & PLAN NOTE
Malnutrition Type:  Context: chronic illness  Level: severe    Related to (etiology):   Inadequate energy protein intake to meet estimated needs    Signs and Symptoms (as evidenced by):   Malnutrition Characteristic Summary:  Weight Loss (Malnutrition): 10% in 6 months  Energy Intake (Malnutrition): less than 75% for greater than or equal to 3 months  Subcutaneous Fat (Malnutrition): severe depletion  Muscle Mass (Malnutrition): severe depletion      Interventions/Recommendations (treatment strategy):  1. Continue regular diet.  2. Add ONS Ensure TID, preferably strawberry. 3. Replete electrolytes as needed. 4. Consider appetite stimulant    Nutrition Diagnosis Status:   Continues

## 2024-11-27 NOTE — PLAN OF CARE
Per Pamela with [facility]/ [facility] , patient accepted with SOC date 12/02/24 11/27/24 2804   Post-Acute Status   Post-Acute Authorization Home Health   Home Health Status Set-up Complete/Auth obtained

## 2024-11-27 NOTE — PLAN OF CARE
Problem: Malnutrition  Goal: Improved Nutritional Intake  Intervention: Promote and Optimize Oral Intake  Flowsheets (Taken 11/27/2024 1428)  Oral Nutrition Promotion: nutrition counseling provided  Nutrition Interventions: supplemental drinks provided

## 2024-11-27 NOTE — PLAN OF CARE
Patient cleared for discharge to home with home health once oxygen is delivered to the bedside.    Patient's son will pick him up via private vehicle at discharge.       11/27/24 1440   Final Note   Assessment Type Final Discharge Note   Anticipated Discharge Disposition Home-Health   Hospital Resources/Appts/Education Provided Appointments scheduled and added to AVS

## 2024-11-27 NOTE — HOSPITAL COURSE
Patient was admitted to the hospital for respiratory failure due to COPD exacerbation and pneumonia.  He was started on broad-spectrum antibiotics, corticosteroids, and scheduled nebs.  He initially had an O2 dependency of 3 liters/minute and was slowly weaned down over the course of his hospitalization.  Blood cultures were negative, so antibiotics were de-escalated to oral doxycycline.  Patient was discharged home with home health and antibiotics and steroids.  He is to follow up with pulmonology as well as primary care.

## 2024-11-27 NOTE — CARE UPDATE
11/27/24 1106   Home Oxygen Qualification   $ Home O2 Qualification Pulmonary Stress Test/6 min walk;Tech time 15 minutes   Room Air SpO2 At Rest 94 %   Room Air SpO2 During Ambulation (!) 88 %   SpO2 During Ambulation on O2 (!) 89 %   Heart Rate on O2 105 bpm   Ambulation O2 LPM 4 LPM   SpO2 Post Ambulation 92 %   Post Ambulation Heart Rate 110 bpm   Post Ambulation O2 LPM 4 LPM   Home O2 Eval Comments   (pt sats dropped to 88% and was placed on 2L, sats dropped further to 85%. O2 was increased to 4L and was allowed time to recover. pt sats dropped to 89% on 4L. Post ambutlation pt was returned to bed and placed on 4L flowmeter. Pt will need 4L home o2)

## 2024-11-27 NOTE — CARE UPDATE
11/26/24 1930 11/26/24 1932 11/26/24 1934   Patient Assessment/Suction   Level of Consciousness (AVPU) alert  --   --    Respiratory Effort Normal;Unlabored  --   --    Expansion/Accessory Muscles/Retractions no use of accessory muscles;no retractions;expansion symmetric  --   --    All Lung Fields Breath Sounds Anterior:;Posterior:;coarse;Lateral:  --   --    Rhythm/Pattern, Respiratory unlabored;pattern regular;depth regular;no shortness of breath reported  --   --    Cough Frequency infrequent  --   --    Cough Type congested;nonproductive  --   --    Skin Integrity   $ Wound Care Tech Time 15 min  --   --    Area Observed Left;Right;Behind ear;Cheek;Nares  --   --    Skin Appearance without discoloration  --   --    PRE-TX-O2   Device (Oxygen Therapy) nasal cannula  --   --    $ Is the patient on Low Flow Oxygen? Yes  --   --    Flow (L/min) (Oxygen Therapy) 2  --   --    SpO2 (!) 92 %  --   --    Pulse Oximetry Type Intermittent  --   --    $ Pulse Oximetry - Multiple Charge Pulse Oximetry - Multiple  --   --    Pulse 83  --   --    Resp 20  --   --    Aerosol Therapy   $ Aerosol Therapy Charges Aerosol Treatment  (arformoterol) Aerosol Treatment  (budesonide) Aerosol Treatment  (DuoNeb)   Daily Review of Necessity (SVN) completed completed completed   Respiratory Treatment Status (SVN) given given given   Treatment Route (SVN) mask mask mask   Patient Position semi-Mcdermott's semi-Mcdermott's semi-Mcdermott's   Post Treatment Assessment (SVN) breath sounds unchanged breath sounds unchanged breath sounds unchanged   Signs of Intolerance (SVN) none none none   Breath Sounds Post-Respiratory Treatment   Throughout All Fields Post-Treatment All Fields All Fields All Fields   Throughout All Fields Post-Treatment no change no change no change   Post-treatment Heart Rate (beats/min) 83 83 83   Post-treatment Resp Rate (breaths/min) 20 20 20   Education   $ Education 30 min;Bronchodilator;Oxygen  --   --

## 2024-11-27 NOTE — CARE UPDATE
11/27/24 0651   Patient Assessment/Suction   Level of Consciousness (AVPU) alert   Respiratory Effort Normal;Unlabored   Expansion/Accessory Muscles/Retractions no use of accessory muscles;no retractions;expansion symmetric   All Lung Fields Breath Sounds diminished   Rhythm/Pattern, Respiratory unlabored;pattern regular;depth regular   Cough Frequency infrequent   Cough Type congested   PRE-TX-O2   Device (Oxygen Therapy) nasal cannula   $ Is the patient on Low Flow Oxygen? Yes   Flow (L/min) (Oxygen Therapy) 2   SpO2 95 %   Pulse Oximetry Type Intermittent   $ Pulse Oximetry - Multiple Charge Pulse Oximetry - Multiple   Pulse 74   Resp 18   Aerosol Therapy   $ Aerosol Therapy Charges Aerosol Treatment   Daily Review of Necessity (SVN) completed   Respiratory Treatment Status (SVN) given   Treatment Route (SVN) oxygen;mask   Patient Position HOB elevated   Post Treatment Assessment (SVN) breath sounds unchanged   Signs of Intolerance (SVN) none

## 2024-11-27 NOTE — PLAN OF CARE
New order received for home health, spoke to patient regarding home health choice, he states he has no preference and would just like a company that is in network with his insurance.     Referral sent via careport to [facility].       11/27/24 1125   Post-Acute Status   Post-Acute Authorization Home Health   [facility] Status  --    Home Health Status Referrals Sent

## 2024-11-28 LAB
OHS QRS DURATION: 90 MS
OHS QTC CALCULATION: 406 MS

## 2024-11-28 NOTE — DISCHARGE SUMMARY
[facility]  Discharge Summary      Patient Name: Mejia Sandoval  MRN: [id number]  CARMENCITA: [id number]  Patient Class: IP- Inpatient  Admission Date: 11/25/2024  Hospital Length of Stay: 2 days  Discharge Date and Time: 11/27/2024  5:20 PM  Attending Physician: Faye att. providers found   Discharging Provider: Trevor Sanchez MD  Primary Care Provider: Faye, Primary Doctor    Primary Care Team: Networked reference to record PCT     HPI:   75 yo male with PMH of COPD not on home oxygen presented to the ER because of shortness of breath.  Patient has been having progressive shortness on breath over the last week after running out of his inhaler.  Also wheezing, productive cough of white sputum.  He denied any fever or chills.  Denied any sore throat, chest pain. He still actively smokes 1 ppd.    In the ER, vitals showed bp of 127/76, HR of 113, respiratory rate of 44, afebrile satting 93 percent on room air.  CBC showed a white count of 20.  CMP with hyponatremia of 133.  Procalcitonin is elevated at 0.7.  CXR showed emphysematous lung disease, and focal hazy opacity of the left lung could reflect an infiltrate.  EKG shows sinus tachycardia.  Patient was found to be in COPD exacerbation and was given methylprednisolone, and neb treatments.  He was also started on doxycycline for pneumonia, and was given 2 L of lactated ringer for hypotension with blood pressure dropping to the 80s systolically.  Patient's oxygen dropped as low as 89% on room air per the ER provider, and so was placed on 2 L nasal cannula.  He will be admitted to Medicine for COPD exacerbation and pneumonia.        * No surgery found *      Hospital Course:   Patient was admitted to the hospital for respiratory failure due to COPD exacerbation and pneumonia.  He was started on broad-spectrum antibiotics, corticosteroids, and scheduled nebs.  He initially had an O2 dependency of 3 liters/minute and was slowly weaned down over the  course of his hospitalization.  Blood cultures were negative, so antibiotics were de-escalated to oral doxycycline.  Patient was discharged home with home health and antibiotics and steroids.  He is to follow up with pulmonology as well as primary care.     Goals of Care Treatment Preferences:  Code Status: Full Code      SDOH Screening:  The patient was screened for utility difficulties, food insecurity, transport difficulties, housing insecurity, and interpersonal safety and there were no concerns identified this admission.     Consults:   Consults (From admission, onward)          Status Ordering Provider     Inpatient consult to Registered Dietitian/Nutritionist  Once        Provider:  (Not yet assigned)    ALCIDES Layton            No new Assessment & Plan notes have been filed under this hospital service since the last note was generated.  Service: Hospital Medicine    Final Active Diagnoses:    Diagnosis Date Noted POA    PRINCIPAL PROBLEM:  Acute hypoxic respiratory failure [J96.01] 11/25/2024 Yes    Severe malnutrition [E43] 11/27/2024 Yes    Malnutrition of moderate degree [E44.0] 11/26/2024 Yes    Pneumonia due to infectious organism [J18.9] 11/25/2024 Yes    COPD with acute exacerbation [J44.1] 11/25/2024 Yes    Tobacco dependency [F17.200] 11/25/2024 Yes      Problems Resolved During this Admission:       Discharged Condition: stable    Disposition: Home or Self Care    Follow Up:   Follow-up Information       [facility] Follow up.    Why: Nurse practitioner to contact patient to schedule home visits.             Maehler, Jewel A, FNP. Go on 12/9/2024.    Specialty: Family Medicine  Why: Hospital follow up scheduled at 11:00 a.m.  Contact information:  [address] [facility]  [address]  [place] [address]  [phone]               [facility] Follow up.    Why: [facility] to provide home oxygen.  Contact information:  [address]  [address]  [place] [address]  [phone]              "[facility] Follow up on 12/2/2024.    Specialties: Physical Therapy, Occupational Therapy, Speech Pathology, Home Health Services  Why: home health will contact patient to schedule home visits.  Contact information:  [address] [place] [id number] [phone]                           Patient Instructions:      OXYGEN FOR HOME USE     Order Specific Question Answer Comments   Liter Flow 4    Duration Continuous    Qualifying Test Performed at: Activity    Oxygen saturation at rest 88    Oxygen saturation with activity 94    Oxygen saturation with activity on oxygen 89    Portable mode: continuous    Route nasal cannula    Device: home concentrator with portable tanks    Length of need (in months): 3 mos    Patient condition with qualifying saturation Other - List qualifying diagnosis and code    Select a diagnosis & list the code in the comments Pneumonia [348621]    Height: 6' 3" (1.905 m)    Weight: 54.7 kg (120 lb 8 oz)    Alternative treatment measures have been tried or considered and deemed clinically ineffective. Yes      Ambulatory referral/consult to Pulmonology   Standing Status: Future   Referral Priority: Routine Referral Type: Consultation   Referral Reason: Specialty Services Required   Requested Specialty: Pulmonary Disease   Number of Visits Requested: 1     Ambulatory referral/consult to Pulmonary Rehab   Standing Status: Future   Referral Priority: Routine Referral Type: Consultation   Referral Reason: Specialty Services Required   Requested Specialty: Pulmonary Disease   Number of Visits Requested: 1     Ambulatory referral/consult to Home Health   Standing Status: Future   Referral Priority: Routine Referral Type: Home Health   Referral Reason: Specialty Services Required   Requested Specialty: Home Health Services   Number of Visits Requested: 1     Ambulatory referral/consult to Smoking Cessation Program   Standing Status: Future   Referral Priority: Routine " Referral Type: Consultation   Referral Reason: Specialty Services Required   Requested Specialty: CTTS   Number of Visits Requested: 1     Ambulatory referral/consult to Outpatient Case Management   Referral Priority: Routine Referral Type: Consultation   Referral Reason: Specialty Services Required   Number of Visits Requested: 1     Ambulatory referral/consult to [facility]   Standing Status: Future   Referral Priority: Routine Referral Type: Consultation   Referral Reason: Specialty Services Required   Number of Visits Requested: 1     Ambulatory referral/consult to Family Practice   Standing Status: Future   Referral Priority: Routine Referral Type: Consultation   Referral Reason: Specialty Services Required   Requested Specialty: Family Medicine   Number of Visits Requested: 1     Diet Adult Regular     Activity as tolerated       Significant Diagnostic Studies: N/A    Pending Diagnostic Studies:       None           Medications:  Reconciled Home Medications:      Medication List        START taking these medications      doxycycline 100 MG Cap  Commonly known as: VIBRAMYCIN  Take 1 capsule (100 mg total) by mouth every 12 (twelve) hours. for 5 days     predniSONE 50 MG Tab  Commonly known as: DELTASONE  Take 1 tablet (50 mg total) by mouth once daily. for 5 days            CONTINUE taking these medications      albuterol 90 mcg/actuation inhaler  Commonly known as: PROVENTIL HFA  Inhale 2 puffs into the lungs every 6 (six) hours as needed for Wheezing. Rescue     albuterol-ipratropium 2.5 mg-0.5 mg/3 mL nebulizer solution  Commonly known as: DUO-NEB  Take 3 mLs by nebulization every 6 (six) hours as needed for Wheezing. Rescue     BC HEADACHE POWDER ORAL  Take 1 packet by mouth as needed.     TRELEGY ELLIPTA 100-62.5-25 mcg Dsdv  Generic drug: fluticasone-umeclidin-vilanter  Inhale 1 puff into the lungs once daily.              Indwelling Lines/Drains at time of discharge:    Lines/Drains/Airways       None                   Time spent on the discharge of patient: 35 minutes         Trevor Sanchez MD  Department of Hospital Medicine  [facility]

## 2024-11-30 LAB
BACTERIA BLD CULT: NORMAL
BACTERIA BLD CULT: NORMAL

## 2024-12-09 ENCOUNTER — PATIENT OUTREACH (OUTPATIENT)
Dept: ADMINISTRATIVE | Facility: OTHER | Age: 74
End: 2024-12-09
Payer: MEDICARE

## 2024-12-09 NOTE — PROGRESS NOTES
CHW - Outreach Attempt    Community Health Worker left a voicemail message for 1st attempt to contact patient regarding: SDOH  Community Health Worker to attempt to contact patient on: both numbers listed    Left contact number with son Mejia.

## 2024-12-12 NOTE — PROGRESS NOTES
CHW - Outreach Attempt    Community Health Worker left a voicemail message for 2nd attempt to contact patient regarding: SDOH  Community Health Worker to attempt to contact patient on: both numbers listed    LVM for son Mejia as well.

## 2024-12-19 NOTE — PROGRESS NOTES
CHW - Unable to Contact    Community Health Worker to close episode at this time due to three missed attempts for patient and son contact.

## 2025-01-25 ENCOUNTER — NURSE TRIAGE (OUTPATIENT)
Dept: ADMINISTRATIVE | Facility: CLINIC | Age: 75
End: 2025-01-25
Payer: MEDICARE
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
## 2025-01-26 ENCOUNTER — HOSPITAL ENCOUNTER (EMERGENCY)
Facility: HOSPITAL | Age: 75
Discharge: HOME | End: 2025-01-26
Attending: EMERGENCY MEDICINE
Payer: MEDICARE

## 2025-01-26 VITALS
TEMPERATURE: 98 F | DIASTOLIC BLOOD PRESSURE: 57 MMHG | HEART RATE: 78 BPM | RESPIRATION RATE: 22 BRPM | OXYGEN SATURATION: 90 % | BODY MASS INDEX: 14.8 KG/M2 | SYSTOLIC BLOOD PRESSURE: 125 MMHG | HEIGHT: 75 IN | WEIGHT: 119.06 LBS

## 2025-01-26 DIAGNOSIS — J44.1 COPD EXACERBATION: Primary | ICD-10-CM

## 2025-01-26 DIAGNOSIS — T20.00XA BURN OF FACE, UNSPECIFIED BURN DEGREE, INITIAL ENCOUNTER: ICD-10-CM

## 2025-01-26 LAB
ANION GAP SERPL CALC-SCNC: 4 MMOL/L (ref 8–16)
BASOPHILS # BLD AUTO: 0.06 K/UL (ref 0–0.2)
BASOPHILS NFR BLD: 0.7 % (ref 0–1.9)
BUN SERPL-MCNC: 25 MG/DL (ref 8–23)
CALCIUM SERPL-MCNC: 9.1 MG/DL (ref 8.7–10.5)
CHLORIDE SERPL-SCNC: 99 MMOL/L (ref 95–110)
CO2 SERPL-SCNC: 29 MMOL/L (ref 23–29)
CREAT SERPL-MCNC: 0.8 MG/DL (ref 0.5–1.4)
DIFFERENTIAL METHOD BLD: ABNORMAL
EOSINOPHIL # BLD AUTO: 0.1 K/UL (ref 0–0.5)
EOSINOPHIL NFR BLD: 0.8 % (ref 0–8)
ERYTHROCYTE [DISTWIDTH] IN BLOOD BY AUTOMATED COUNT: 13.9 % (ref 11.5–14.5)
EST. GFR  (NO RACE VARIABLE): >60 ML/MIN/1.73 M^2
GLUCOSE SERPL-MCNC: 95 MG/DL (ref 70–110)
HCT VFR BLD AUTO: 39.7 % (ref 40–54)
HGB BLD-MCNC: 13 G/DL (ref 14–18)
IMM GRANULOCYTES # BLD AUTO: 0.04 K/UL (ref 0–0.04)
IMM GRANULOCYTES NFR BLD AUTO: 0.4 % (ref 0–0.5)
LYMPHOCYTES # BLD AUTO: 1.6 K/UL (ref 1–4.8)
LYMPHOCYTES NFR BLD: 17.3 % (ref 18–48)
MCH RBC QN AUTO: 30.1 PG (ref 27–31)
MCHC RBC AUTO-ENTMCNC: 32.7 G/DL (ref 32–36)
MCV RBC AUTO: 92 FL (ref 82–98)
MONOCYTES # BLD AUTO: 1.1 K/UL (ref 0.3–1)
MONOCYTES NFR BLD: 12.3 % (ref 4–15)
NEUTROPHILS # BLD AUTO: 6.2 K/UL (ref 1.8–7.7)
NEUTROPHILS NFR BLD: 68.5 % (ref 38–73)
NRBC BLD-RTO: 0 /100 WBC
PLATELET # BLD AUTO: 353 K/UL (ref 150–450)
PMV BLD AUTO: 9.8 FL (ref 9.2–12.9)
POTASSIUM SERPL-SCNC: 4.1 MMOL/L (ref 3.5–5.1)
RBC # BLD AUTO: 4.32 M/UL (ref 4.6–6.2)
SODIUM SERPL-SCNC: 132 MMOL/L (ref 136–145)
WBC # BLD AUTO: 9.03 K/UL (ref 3.9–12.7)

## 2025-01-26 PROCEDURE — 96374 THER/PROPH/DIAG INJ IV PUSH: CPT

## 2025-01-26 PROCEDURE — 25000242 PHARM REV CODE 250 ALT 637 W/ HCPCS: Performed by: EMERGENCY MEDICINE

## 2025-01-26 PROCEDURE — 94760 N-INVAS EAR/PLS OXIMETRY 1: CPT

## 2025-01-26 PROCEDURE — 63600175 PHARM REV CODE 636 W HCPCS: Mod: JZ,TB | Performed by: EMERGENCY MEDICINE

## 2025-01-26 PROCEDURE — 94640 AIRWAY INHALATION TREATMENT: CPT

## 2025-01-26 PROCEDURE — 80048 BASIC METABOLIC PNL TOTAL CA: CPT | Performed by: EMERGENCY MEDICINE

## 2025-01-26 PROCEDURE — 25000003 PHARM REV CODE 250: Performed by: EMERGENCY MEDICINE

## 2025-01-26 PROCEDURE — 27000221 HC OXYGEN, UP TO 24 HOURS

## 2025-01-26 PROCEDURE — 99284 EMERGENCY DEPT VISIT MOD MDM: CPT | Mod: 25

## 2025-01-26 PROCEDURE — 96361 HYDRATE IV INFUSION ADD-ON: CPT

## 2025-01-26 PROCEDURE — 85025 COMPLETE CBC W/AUTO DIFF WBC: CPT | Performed by: EMERGENCY MEDICINE

## 2025-01-26 RX ORDER — METHYLPREDNISOLONE SOD SUCC 125 MG
125 VIAL (EA) INJECTION
Status: COMPLETED | OUTPATIENT
Start: 2025-01-26 | End: 2025-01-26

## 2025-01-26 RX ORDER — IPRATROPIUM BROMIDE AND ALBUTEROL SULFATE 2.5; .5 MG/3ML; MG/3ML
3 SOLUTION RESPIRATORY (INHALATION)
Status: COMPLETED | OUTPATIENT
Start: 2025-01-26 | End: 2025-01-26

## 2025-01-26 RX ADMIN — SODIUM CHLORIDE 500 ML: 9 INJECTION, SOLUTION INTRAVENOUS at 03:01

## 2025-01-26 RX ADMIN — METHYLPREDNISOLONE SODIUM SUCCINATE 125 MG: 125 INJECTION, POWDER, FOR SOLUTION INTRAMUSCULAR; INTRAVENOUS at 01:01

## 2025-01-26 RX ADMIN — IPRATROPIUM BROMIDE AND ALBUTEROL SULFATE 3 ML: .5; 3 SOLUTION RESPIRATORY (INHALATION) at 02:01
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
## 2025-01-27 NOTE — PLAN OF CARE
01/27/25 0842   Discharge Reassessment   Assessment Type Discharge Planning Reassessment   Did the patient's condition or plan change since previous assessment? Yes   Post-Acute Status   Hospital Resources/Appts/Education Provided Appointments scheduled and added to AVS   Discharge Delays None known at this time     Pt has [facility] appointment scheduled for on 2/10/2025 w/ Kamille Kendall NP arrival time 10:30. Please bring ER D/C paperwork, proof of income/insurance, ID, and medication list. If you can't keep this appointment please contact [facility] [phone].

## 2025-02-02 ENCOUNTER — NURSE TRIAGE (OUTPATIENT)
Dept: ADMINISTRATIVE | Facility: CLINIC | Age: 75
End: 2025-02-02
Payer: MEDICARE

## 2025-02-02 NOTE — TELEPHONE ENCOUNTER
Trevin, Mejia's son states Mejia reports white stool noticed within past hour. Denies abdominal pain, constipation and diarrhea. Per triage protocol, see PCP within next 24 hours. Appt scheduled with NP Pleschetta Jackson at 0830 on 2/3/25. V/u.   Reason for Disposition   [1] Stool is light gray or whitish AND [2] unexplained    Additional Information   Negative: Rectal bleeding, bloody stools, or blood in stool (bowel movement)   Negative: Black or tarry bowel movements   Negative: [1] Stool color is black (not dark green) AND [2] patient hasn't swallowed substance that causes black stools (e.g., Pepto-Bismol, iron pills)   Negative: Diarrhea stools (i.e., watery stools, or increase in the frequency and looseness of stools)   Negative: [1] Abdomen pain is main symptom AND [2] male   Negative: Yellow eyes (jaundice)   Negative: Patient sounds very sick or weak to the triager    Protocols used: Stools - Unusual Color-A-AH

## 2025-02-03 ENCOUNTER — HOSPITAL ENCOUNTER (OUTPATIENT)
Dept: RADIOLOGY | Facility: CLINIC | Age: 75
Discharge: HOME | End: 2025-02-03
Payer: MEDICARE

## 2025-02-03 ENCOUNTER — OFFICE VISIT (OUTPATIENT)
Dept: FAMILY MEDICINE | Facility: CLINIC | Age: 75
End: 2025-02-03
Payer: MEDICARE

## 2025-02-03 ENCOUNTER — PATIENT OUTREACH (OUTPATIENT)
Dept: ADMINISTRATIVE | Facility: OTHER | Age: 75
End: 2025-02-03
Payer: MEDICARE

## 2025-02-03 ENCOUNTER — TELEPHONE (OUTPATIENT)
Dept: PRIMARY CARE CLINIC | Facility: CLINIC | Age: 75
End: 2025-02-03
Payer: MEDICARE

## 2025-02-03 VITALS
HEART RATE: 86 BPM | SYSTOLIC BLOOD PRESSURE: 90 MMHG | OXYGEN SATURATION: 99 % | DIASTOLIC BLOOD PRESSURE: 60 MMHG | WEIGHT: 125.25 LBS | HEIGHT: 75 IN | TEMPERATURE: 98 F | BODY MASS INDEX: 15.57 KG/M2

## 2025-02-03 DIAGNOSIS — R19.5 ABNORMAL STOOL COLOR: ICD-10-CM

## 2025-02-03 DIAGNOSIS — J43.9 PULMONARY EMPHYSEMA, UNSPECIFIED EMPHYSEMA TYPE: ICD-10-CM

## 2025-02-03 DIAGNOSIS — R91.1 LUNG NODULE: ICD-10-CM

## 2025-02-03 DIAGNOSIS — R19.5 ABNORMAL STOOL COLOR: Primary | ICD-10-CM

## 2025-02-03 DIAGNOSIS — Z59.82 TRANSPORTATION INSECURITY: ICD-10-CM

## 2025-02-03 PROCEDURE — 1126F AMNT PAIN NOTED NONE PRSNT: CPT | Mod: CPTII,S$GLB,,

## 2025-02-03 PROCEDURE — 3288F FALL RISK ASSESSMENT DOCD: CPT | Mod: CPTII,S$GLB,,

## 2025-02-03 PROCEDURE — 3074F SYST BP LT 130 MM HG: CPT | Mod: CPTII,S$GLB,,

## 2025-02-03 PROCEDURE — 74019 RADEX ABDOMEN 2 VIEWS: CPT | Mod: 26,,, | Performed by: RADIOLOGY

## 2025-02-03 PROCEDURE — 99214 OFFICE O/P EST MOD 30 MIN: CPT | Mod: S$GLB,,,

## 2025-02-03 PROCEDURE — 99999 PR PBB SHADOW E&M-EST. PATIENT-LVL V: CPT | Mod: PBBFAC,,,

## 2025-02-03 PROCEDURE — 3078F DIAST BP <80 MM HG: CPT | Mod: CPTII,S$GLB,,

## 2025-02-03 PROCEDURE — 1160F RVW MEDS BY RX/DR IN RCRD: CPT | Mod: CPTII,S$GLB,,

## 2025-02-03 PROCEDURE — 1101F PT FALLS ASSESS-DOCD LE1/YR: CPT | Mod: CPTII,S$GLB,,

## 2025-02-03 PROCEDURE — 99215 OFFICE O/P EST HI 40 MIN: CPT | Mod: PBBFAC,25,PO

## 2025-02-03 PROCEDURE — 1159F MED LIST DOCD IN RCRD: CPT | Mod: CPTII,S$GLB,,

## 2025-02-03 PROCEDURE — 74019 RADEX ABDOMEN 2 VIEWS: CPT | Mod: TC,FY,PO

## 2025-02-03 RX ORDER — FLUTICASONE FUROATE, UMECLIDINIUM BROMIDE AND VILANTEROL TRIFENATATE 100; 62.5; 25 UG/1; UG/1; UG/1
1 POWDER RESPIRATORY (INHALATION) DAILY
Qty: 28 EACH | Refills: 2 | Status: SHIPPED | OUTPATIENT
Start: 2025-02-03 | End: 2025-02-03

## 2025-02-03 RX ORDER — ALBUTEROL SULFATE 90 UG/1
2 INHALANT RESPIRATORY (INHALATION) EVERY 6 HOURS PRN
Qty: 6.7 G | Refills: 5 | Status: SHIPPED | OUTPATIENT
Start: 2025-02-03 | End: 2026-02-03

## 2025-02-03 RX ORDER — FLUTICASONE FUROATE, UMECLIDINIUM BROMIDE AND VILANTEROL TRIFENATATE 100; 62.5; 25 UG/1; UG/1; UG/1
1 POWDER RESPIRATORY (INHALATION) DAILY
Qty: 28 EACH | Refills: 2 | Status: SHIPPED | OUTPATIENT
Start: 2025-02-03

## 2025-02-03 SDOH — SOCIAL DETERMINANTS OF HEALTH (SDOH): TRANSPORTATION INSECURITY: Z59.82

## 2025-02-03 NOTE — PROGRESS NOTES
CHW - Outreach Attempt    Community Health Worker left a voicemail message for 1st attempt to contact patient regarding: transportation  Community Health Worker to attempt to contact patient on: [phone]
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
## 2025-02-03 NOTE — TELEPHONE ENCOUNTER
----- Message from Diamond Caceres PA-C sent at 2/3/2025  1:58 PM CST -----  Hi Mr. Sandoval,     I've reviewed your abdominal X-ray, which showed no acute abnormalities, though it did indicate a moderate amount of stool present. To help with this, I recommend increasing your water intake and continuing with daily Miralax. Additionally, increasing your fiber intake to 25-30 grams daily could help facilitate bowel movements.  Your other lab results are still pending, and I'll update you as soon as those are available.    I also recommend establishing care with one of our providers, and my staff has already scheduled that appointment for you with one of our physicians. It would be beneficial to follow up in 3-4 weeks to reassess your symptoms. My team can help you schedule an appointment with your future care team as well. Additionally, pulmonology will be reaching out to schedule an appointment soon. I recommend keeping that appointment so we can maximize treatment for you!     Please feel free to reach out if you have any further questions.     Diamond Caceres PA-C

## 2025-02-05 ENCOUNTER — PATIENT OUTREACH (OUTPATIENT)
Dept: ADMINISTRATIVE | Facility: OTHER | Age: 75
End: 2025-02-05
Payer: MEDICARE

## 2025-02-05 ENCOUNTER — TELEPHONE (OUTPATIENT)
Dept: FAMILY MEDICINE | Facility: CLINIC | Age: 75
End: 2025-02-05
Payer: MEDICARE

## 2025-02-05 NOTE — PROGRESS NOTES
CHW - Outreach Attempt    Community Health Worker left a voicemail message for 2nd attempt to contact patient regarding: transportation  Community Health Worker to attempt to contact patient on: [phone]

## 2025-02-05 NOTE — TELEPHONE ENCOUNTER
----- Message from Diamond Caceres PA-C sent at 2/5/2025  3:37 PM CST -----  Hi Mr. Sandoval,     I have reviewed your lab results, and there are no significant abnormalities at this time. Your platelet count is elevated, which can be a sign of inflammation, but it is not acutely concerning at this point. We will continue to monitor this moving forward. I also recommend proceeding with the ultrasound as scheduled. Once I receive the results, I will update you accordingly. If you have any additional questions, please feel free to reach out.    Additionally, case management has been trying to contact you regarding transportation services. I encourage you to reach out to them when you have the chance. Lastly, I recommend establishing care with one of our providers for ongoing management. My staff can assist in scheduling this appointment. Having a primary provider will help ensure continuity of care, which is essential for your overall health.    Diamond Caceres PA-C

## 2025-02-05 NOTE — TELEPHONE ENCOUNTER
Patient's son was notified of results. Patient's son verbalized understanding.     Patient already has an [facility] patient w/ Dr. Avealr.    The number for community outreach for transportation was provided to patient's son.

## 2025-02-11 ENCOUNTER — PATIENT OUTREACH (OUTPATIENT)
Dept: ADMINISTRATIVE | Facility: OTHER | Age: 75
End: 2025-02-11
Payer: MEDICARE

## 2025-04-04 ENCOUNTER — OUTPATIENT CASE MANAGEMENT (OUTPATIENT)
Dept: ADMINISTRATIVE | Facility: OTHER | Age: 75
End: 2025-04-04
Payer: MEDICARE

## 2025-04-04 NOTE — PROGRESS NOTES
Outpatient Care Management  Patient Does Not Consent    Patient: Mejia Sandoval  MRN:  [id number]  Date of Service:  4/4/2025  Completed by:  Fidelia Tsang RN    Chief Complaint   Patient presents with    OPCM Enrollment Call    Case Closure       Patient Summary           Consent Received:  Decline  Decline Reason:  Not interested

## 2025-04-21 ENCOUNTER — OFFICE VISIT (OUTPATIENT)
Dept: PULMONOLOGY | Facility: CLINIC | Age: 75
End: 2025-04-21
Payer: MEDICARE

## 2025-04-21 VITALS
DIASTOLIC BLOOD PRESSURE: 69 MMHG | HEART RATE: 74 BPM | SYSTOLIC BLOOD PRESSURE: 103 MMHG | HEIGHT: 75 IN | WEIGHT: 126.88 LBS | BODY MASS INDEX: 15.78 KG/M2 | OXYGEN SATURATION: 91 %

## 2025-04-21 DIAGNOSIS — R91.1 LUNG NODULE: ICD-10-CM

## 2025-04-21 DIAGNOSIS — J43.9 PULMONARY EMPHYSEMA, UNSPECIFIED EMPHYSEMA TYPE: ICD-10-CM

## 2025-04-21 DIAGNOSIS — J96.11 CHRONIC HYPOXEMIC RESPIRATORY FAILURE: Primary | ICD-10-CM

## 2025-04-21 DIAGNOSIS — J18.9 PNEUMONIA OF LEFT LOWER LOBE DUE TO INFECTIOUS ORGANISM: ICD-10-CM

## 2025-04-21 DIAGNOSIS — J44.1 COPD EXACERBATION: ICD-10-CM

## 2025-04-21 PROCEDURE — 3078F DIAST BP <80 MM HG: CPT | Mod: HCNC,CPTII,S$GLB, | Performed by: INTERNAL MEDICINE

## 2025-04-21 PROCEDURE — 3074F SYST BP LT 130 MM HG: CPT | Mod: HCNC,CPTII,S$GLB, | Performed by: INTERNAL MEDICINE

## 2025-04-21 PROCEDURE — 3288F FALL RISK ASSESSMENT DOCD: CPT | Mod: HCNC,CPTII,S$GLB, | Performed by: INTERNAL MEDICINE

## 2025-04-21 PROCEDURE — 99999 PR PBB SHADOW E&M-EST. PATIENT-LVL IV: CPT | Mod: PBBFAC,HCNC,, | Performed by: INTERNAL MEDICINE

## 2025-04-21 PROCEDURE — 1159F MED LIST DOCD IN RCRD: CPT | Mod: HCNC,CPTII,S$GLB, | Performed by: INTERNAL MEDICINE

## 2025-04-21 PROCEDURE — 1101F PT FALLS ASSESS-DOCD LE1/YR: CPT | Mod: HCNC,CPTII,S$GLB, | Performed by: INTERNAL MEDICINE

## 2025-04-21 PROCEDURE — 99203 OFFICE O/P NEW LOW 30 MIN: CPT | Mod: HCNC,S$GLB,, | Performed by: INTERNAL MEDICINE

## 2025-04-21 RX ORDER — ENSIFENTRINE 3 MG/2.5ML
3 SUSPENSION RESPIRATORY (INHALATION) 2 TIMES DAILY
Qty: 150 ML | Refills: 11 | Status: SHIPPED | OUTPATIENT
Start: 2025-04-21

## 2025-04-21 RX ORDER — ALPRAZOLAM 0.25 MG/1
0.25 TABLET ORAL 2 TIMES DAILY PRN
Qty: 60 TABLET | Refills: 1 | Status: SHIPPED | OUTPATIENT
Start: 2025-04-21

## 2025-04-21 RX ORDER — ENSIFENTRINE 3 MG/2.5ML
3 SUSPENSION RESPIRATORY (INHALATION) 2 TIMES DAILY
Qty: 150 ML | Refills: 11 | Status: SHIPPED | OUTPATIENT
Start: 2025-04-21 | End: 2025-04-21 | Stop reason: SDUPTHER

## 2025-04-21 RX ORDER — ALBUTEROL SULFATE 90 UG/1
2 INHALANT RESPIRATORY (INHALATION) EVERY 4 HOURS PRN
Qty: 13.4 G | Refills: 11 | Status: SHIPPED | OUTPATIENT
Start: 2025-04-21 | End: 2026-04-21

## 2025-04-21 RX ORDER — PREDNISONE 20 MG/1
TABLET ORAL
Qty: 21 TABLET | Refills: 2 | Status: SHIPPED | OUTPATIENT
Start: 2025-04-21

## 2025-04-21 RX ORDER — FLUTICASONE FUROATE, UMECLIDINIUM BROMIDE AND VILANTEROL TRIFENATATE 200; 62.5; 25 UG/1; UG/1; UG/1
1 POWDER RESPIRATORY (INHALATION) DAILY
Qty: 60 EACH | Refills: 11 | Status: SHIPPED | OUTPATIENT
Start: 2025-04-21

## 2025-04-21 NOTE — PATIENT INSTRUCTIONS
Ct chest viewed from 2024 -- extremely over inflated ,  emphysema severe,    We discuss smoke cessation -- note this time    Will increase trelegy to 200 dose    Use prednisone 20mg daily for 7 days, repeat once a month, may use more if  needed.    Use xanax as needed for anxiety and sleep, use mainly for sleep.  Should have observation if uses xanax-- only use at bedtime.  Need checked every 6 months for refils.  Addicting medication-- will not increase dose    Ohtuvayre nebulizer may help-- hard to arrange and may take months if arranged.  Only nebulized.        Avoid smokes    Use oxygen if short breath...  would recommend oxygen for sleep.       Will execute [place] [facility] next visit..

## 2025-04-21 NOTE — PROGRESS NOTES
"4/21/2025    Mejia Sandoval  New Patient Consult    Chief Complaint   Patient presents with    Pulmonary Nodules    Emphysema       HPI:     4/21/2025 pt had er visit 1/2025 with copd exacerbation-- had oxygen problem smoking..   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  Breathing same- not variable.  Pt lives [place] [employer] [place]  Pt has min house work -- checks bildge.   Has oxygen but not using.       Pt uses trelegy and prn albuterol           PFSH:  Past Medical History:   Diagnosis Date    COPD (chronic obstructive pulmonary disease)          Past Surgical History:   Procedure Laterality Date    MOUTH SURGERY  2000     Social History[1]  No family history on file.  Review of patient's allergies indicates:  No Known Allergies       Review of Systems:  a review of eleven systems covering constitutional, Eye, HEENT, Psych, Respiratory, Cardiac, GI, , Musculoskeletal, Endocrine, Dermatologic was negative except for pertinent findings as listed ABOVE and below:  pertinent positives as above, rest good        Exam:Comprehensive exam done. /69 (BP Location: Left arm, Patient Position: Sitting)   Pulse 74   Ht 6' 3" (1.905 m)   Wt 57.6 kg (126 lb 14 oz)   SpO2 (!) 91% Comment: on room air at rest  BMI 15.86 kg/m²   Exam included Vitals as listed, and patient's appearance and affect and alertness and mood, oral exam for yeast and hygiene and pharynx lesions and Mallapatti (M) score, neck with inspection for jvd and masses and thyroid abnormalities and lymph nodes (supraclavicular and infraclavicular nodes and axillary also examined and noted if abn), chest exam included symmetry and effort and fremitus and percussion and auscultation, cardiac exam included rhythm and gallops and murmur and rubs and jvd and edema, abdominal exam for mass and hepatosplenomegaly and tenderness and hernias and bowel sounds, Musculoskeletal exam with muscle tone and posture and " mobility/gait and  strength, and skin for rashes and cyanosis and pallor and turgor, extremity for clubbing.  Findings were normal except for pertinent findings listed below:  M1,cachexia, sl rhonchi bilat, rest good         Radiographs (ct chest and cxr) reviewed: view by direct vision       Labs reviewed           PFT was not done       Plan:  Clinical impression is apparently straight forward and impression with management as below.    Mejia was seen today for pulmonary nodules and emphysema.    Diagnoses and all orders for this visit:    Chronic hypoxemic respiratory failure  -     HME - OTHER    COPD exacerbation  -     Ambulatory referral/consult to Pulmonology    Pneumonia of left lower lobe due to infectious organism  -     Ambulatory referral/consult to Pulmonology    Pulmonary emphysema, unspecified emphysema type  -     Ambulatory referral/consult to Pulmonology  -     predniSONE (DELTASONE) 20 MG tablet; Take one daily for 7 days and may repeat for shortness of breath.  -     albuterol (PROVENTIL HFA) 90 mcg/actuation inhaler; Inhale 2 puffs into the lungs every 4 (four) hours as needed for Wheezing or Shortness of Breath. Rescue  -     fluticasone-umeclidin-vilanter (TRELEGY ELLIPTA) 200-62.5-25 mcg inhaler; Inhale 1 puff into the lungs once daily.  -     ensifentrine (OHTUVAYRE) 3 mg/2.5 mL NbSp; Inhale 2.5 mLs (3 mg total) into the lungs 2 (two) times daily.  -     ALPRAZolam (XANAX) 0.25 MG tablet; Take 1 tablet (0.25 mg total) by mouth 2 (two) times daily as needed for Anxiety or Insomnia.  -     HME - OTHER    Lung nodule  -     Ambulatory referral/consult to Pulmonology        Follow up in about 2 months (around 6/21/2025), or if symptoms worsen or fail to improve.    Discussed with patient above for education the following:      Patient Instructions   Ct chest viewed from 2024 -- extremely over inflated ,  emphysema severe,    We discuss smoke cessation -- note this time    Will increase  trelegy to 200 dose    Use prednisone 20mg daily for 7 days, repeat once a month, may use more if  needed.    Use xanax as needed for anxiety and sleep, use mainly for sleep.  Should have observation if uses xanax-- only use at bedtime.  Need checked every 6 months for refils.  Addicting medication-- will not increase dose    Ohtuvayre nebulizer may help-- hard to arrange and may take months if arranged.  Only nebulized.        Avoid smokes    Use oxygen if short breath...  would recommend oxygen for sleep.       Will execute [place] [facility] next visit..    Eval took 41 min       [1]   Social History  Tobacco Use    Smoking status: Every Day     Current packs/day: 1.00     Average packs/day: 1 pack/day for 61.3 years (61.3 ttl pk-yrs)     Types: Cigarettes     Start date: 1964    Smokeless tobacco: Never   Substance Use Topics    Alcohol use: Not Currently    Drug use: Never

## 2025-04-23 ENCOUNTER — TELEPHONE (OUTPATIENT)
Dept: PULMONOLOGY | Facility: CLINIC | Age: 75
End: 2025-04-23
Payer: MEDICARE

## 2025-04-23 NOTE — TELEPHONE ENCOUNTER
Faxed over prescription form, script, insurance card and office notes for Marielare to [phone].

## 2025-04-28 ENCOUNTER — TELEPHONE (OUTPATIENT)
Dept: PULMONOLOGY | Facility: CLINIC | Age: 75
End: 2025-04-28
Payer: MEDICARE

## 2025-04-28 DIAGNOSIS — J43.9 PULMONARY EMPHYSEMA, UNSPECIFIED EMPHYSEMA TYPE: ICD-10-CM

## 2025-04-28 RX ORDER — PREDNISONE 20 MG/1
TABLET ORAL
Qty: 21 TABLET | Refills: 2 | Status: SHIPPED | OUTPATIENT
Start: 2025-04-28 | End: 2025-04-28 | Stop reason: SDUPTHER

## 2025-04-28 RX ORDER — PREDNISONE 20 MG/1
TABLET ORAL
Qty: 28 TABLET | Refills: 2 | Status: SHIPPED | OUTPATIENT
Start: 2025-04-28

## 2025-04-28 NOTE — TELEPHONE ENCOUNTER
Per pt he has been taking 2-3 tablets a day of prednisone and is almost out   explained that directions are one tablet daily  pt states that directions are 2 puffs explained that was for his inhaler  asked if his son handles his medications pt confirmed advised I would contact him   Mejia, son, said pt told him that the dr said he could take up to two pills a day  explained that was the directions for the xanax not prednisone but that I would send a message to the dr and call him back     Please advise how you would like pt to take prednisone and if more than one pill a day send a new rx to local pharmacy

## 2025-04-28 NOTE — PROGRESS NOTES
The patient was prescribed 21 prednisone pills on April 21st-calls in today saying he needs more prednisone pills.  He he mistakenly took 2 a day.  The 21 pill should last him over 10 days.    A new prescription sent in ask in the pharmacy to refill at 2 prednisone pills a day-20 mg-for 7 days repeat a breathing is poor.  Would request staff to try to have him come into the office sooner to make sure things were cleared as far as medicine dosing

## 2025-04-28 NOTE — TELEPHONE ENCOUNTER
----- Message from Ambika sent at 4/28/2025  8:06 AM CDT -----  Contact: Mejia Lee  Type:  RX Refill RequestWho Called:   Mejia LeeRefill or New Rx:  NewRX Name and Strength:  predniSONE (DELTASONE) 20 MG tablet Preferred Pharmacy with phone number:  [id number] - [place] [address] [place] [address]Wjzrp: [phone] Fax: [phone] or Mail Order:  LocalOrdering Provider:  Dr Mitch Meekould the patient rather a call back or a response via [facility]?   Call [facility] Call Back Number:    [phone] - Mejia LeeAdditional Information:   States he is already out of this medication, but insurance will not pay until 5/7 - states pharmacist advised to contact provider to have directions written differently on a new prescription so insurance can pick it up - please call - thank you